# Patient Record
Sex: MALE | Race: OTHER | NOT HISPANIC OR LATINO | URBAN - METROPOLITAN AREA
[De-identification: names, ages, dates, MRNs, and addresses within clinical notes are randomized per-mention and may not be internally consistent; named-entity substitution may affect disease eponyms.]

---

## 2020-08-27 ENCOUNTER — EMERGENCY (EMERGENCY)
Facility: HOSPITAL | Age: 40
LOS: 1 days | Discharge: HOME | End: 2020-08-27
Attending: EMERGENCY MEDICINE
Payer: MEDICAID

## 2020-08-27 VITALS
SYSTOLIC BLOOD PRESSURE: 127 MMHG | WEIGHT: 198.42 LBS | TEMPERATURE: 98 F | RESPIRATION RATE: 16 BRPM | HEART RATE: 84 BPM | DIASTOLIC BLOOD PRESSURE: 85 MMHG | OXYGEN SATURATION: 98 %

## 2020-08-27 LAB
ALBUMIN SERPL ELPH-MCNC: 3.7 G/DL — SIGNIFICANT CHANGE UP (ref 3.5–5)
ALP SERPL-CCNC: 76 U/L — SIGNIFICANT CHANGE UP (ref 40–120)
ALT FLD-CCNC: 55 U/L DA — SIGNIFICANT CHANGE UP (ref 10–60)
ANION GAP SERPL CALC-SCNC: 4 MMOL/L — LOW (ref 5–17)
APPEARANCE UR: CLEAR — SIGNIFICANT CHANGE UP
AST SERPL-CCNC: 29 U/L — SIGNIFICANT CHANGE UP (ref 10–40)
BACTERIA # UR AUTO: ABNORMAL /HPF
BASOPHILS # BLD AUTO: 0.06 K/UL — SIGNIFICANT CHANGE UP (ref 0–0.2)
BASOPHILS NFR BLD AUTO: 0.7 % — SIGNIFICANT CHANGE UP (ref 0–2)
BILIRUB SERPL-MCNC: 0.4 MG/DL — SIGNIFICANT CHANGE UP (ref 0.2–1.2)
BILIRUB UR-MCNC: NEGATIVE — SIGNIFICANT CHANGE UP
BUN SERPL-MCNC: 23 MG/DL — HIGH (ref 7–18)
CALCIUM SERPL-MCNC: 8.8 MG/DL — SIGNIFICANT CHANGE UP (ref 8.4–10.5)
CHLORIDE SERPL-SCNC: 106 MMOL/L — SIGNIFICANT CHANGE UP (ref 96–108)
CO2 SERPL-SCNC: 29 MMOL/L — SIGNIFICANT CHANGE UP (ref 22–31)
COLOR SPEC: YELLOW — SIGNIFICANT CHANGE UP
CREAT SERPL-MCNC: 1.21 MG/DL — SIGNIFICANT CHANGE UP (ref 0.5–1.3)
DIFF PNL FLD: NEGATIVE — SIGNIFICANT CHANGE UP
EOSINOPHIL # BLD AUTO: 0.18 K/UL — SIGNIFICANT CHANGE UP (ref 0–0.5)
EOSINOPHIL NFR BLD AUTO: 2 % — SIGNIFICANT CHANGE UP (ref 0–6)
EPI CELLS # UR: SIGNIFICANT CHANGE UP /HPF
GLUCOSE SERPL-MCNC: 80 MG/DL — SIGNIFICANT CHANGE UP (ref 70–99)
GLUCOSE UR QL: NEGATIVE — SIGNIFICANT CHANGE UP
HCT VFR BLD CALC: 44.4 % — SIGNIFICANT CHANGE UP (ref 39–50)
HGB BLD-MCNC: 14.7 G/DL — SIGNIFICANT CHANGE UP (ref 13–17)
HIV 1 & 2 AB SERPL IA.RAPID: SIGNIFICANT CHANGE UP
IMM GRANULOCYTES NFR BLD AUTO: 0.5 % — SIGNIFICANT CHANGE UP (ref 0–1.5)
KETONES UR-MCNC: NEGATIVE — SIGNIFICANT CHANGE UP
LEUKOCYTE ESTERASE UR-ACNC: NEGATIVE — SIGNIFICANT CHANGE UP
LYMPHOCYTES # BLD AUTO: 2.87 K/UL — SIGNIFICANT CHANGE UP (ref 1–3.3)
LYMPHOCYTES # BLD AUTO: 32.7 % — SIGNIFICANT CHANGE UP (ref 13–44)
MAGNESIUM SERPL-MCNC: 2.3 MG/DL — SIGNIFICANT CHANGE UP (ref 1.6–2.6)
MCHC RBC-ENTMCNC: 29.3 PG — SIGNIFICANT CHANGE UP (ref 27–34)
MCHC RBC-ENTMCNC: 33.1 GM/DL — SIGNIFICANT CHANGE UP (ref 32–36)
MCV RBC AUTO: 88.4 FL — SIGNIFICANT CHANGE UP (ref 80–100)
MONOCYTES # BLD AUTO: 0.84 K/UL — SIGNIFICANT CHANGE UP (ref 0–0.9)
MONOCYTES NFR BLD AUTO: 9.6 % — SIGNIFICANT CHANGE UP (ref 2–14)
NEUTROPHILS # BLD AUTO: 4.8 K/UL — SIGNIFICANT CHANGE UP (ref 1.8–7.4)
NEUTROPHILS NFR BLD AUTO: 54.5 % — SIGNIFICANT CHANGE UP (ref 43–77)
NITRITE UR-MCNC: NEGATIVE — SIGNIFICANT CHANGE UP
NRBC # BLD: 0 /100 WBCS — SIGNIFICANT CHANGE UP (ref 0–0)
PH UR: 6 — SIGNIFICANT CHANGE UP (ref 5–8)
PHOSPHATE SERPL-MCNC: 2.8 MG/DL — SIGNIFICANT CHANGE UP (ref 2.5–4.5)
PLATELET # BLD AUTO: 199 K/UL — SIGNIFICANT CHANGE UP (ref 150–400)
POTASSIUM SERPL-MCNC: 3.8 MMOL/L — SIGNIFICANT CHANGE UP (ref 3.5–5.3)
POTASSIUM SERPL-SCNC: 3.8 MMOL/L — SIGNIFICANT CHANGE UP (ref 3.5–5.3)
PROT SERPL-MCNC: 8 G/DL — SIGNIFICANT CHANGE UP (ref 6–8.3)
PROT UR-MCNC: 30 MG/DL
RBC # BLD: 5.02 M/UL — SIGNIFICANT CHANGE UP (ref 4.2–5.8)
RBC # FLD: 16.1 % — HIGH (ref 10.3–14.5)
RBC CASTS # UR COMP ASSIST: SIGNIFICANT CHANGE UP /HPF (ref 0–2)
SODIUM SERPL-SCNC: 139 MMOL/L — SIGNIFICANT CHANGE UP (ref 135–145)
SP GR SPEC: 1.02 — SIGNIFICANT CHANGE UP (ref 1.01–1.02)
UROBILINOGEN FLD QL: 1
WBC # BLD: 8.79 K/UL — SIGNIFICANT CHANGE UP (ref 3.8–10.5)
WBC # FLD AUTO: 8.79 K/UL — SIGNIFICANT CHANGE UP (ref 3.8–10.5)
WBC UR QL: SIGNIFICANT CHANGE UP /HPF (ref 0–5)

## 2020-08-27 PROCEDURE — 84443 ASSAY THYROID STIM HORMONE: CPT

## 2020-08-27 PROCEDURE — 99283 EMERGENCY DEPT VISIT LOW MDM: CPT

## 2020-08-27 PROCEDURE — 80053 COMPREHEN METABOLIC PANEL: CPT

## 2020-08-27 PROCEDURE — 85027 COMPLETE CBC AUTOMATED: CPT

## 2020-08-27 PROCEDURE — 84100 ASSAY OF PHOSPHORUS: CPT

## 2020-08-27 PROCEDURE — 81001 URINALYSIS AUTO W/SCOPE: CPT

## 2020-08-27 PROCEDURE — 87086 URINE CULTURE/COLONY COUNT: CPT

## 2020-08-27 PROCEDURE — 99284 EMERGENCY DEPT VISIT MOD MDM: CPT

## 2020-08-27 PROCEDURE — 36415 COLL VENOUS BLD VENIPUNCTURE: CPT

## 2020-08-27 PROCEDURE — 86703 HIV-1/HIV-2 1 RESULT ANTBDY: CPT

## 2020-08-27 PROCEDURE — 83735 ASSAY OF MAGNESIUM: CPT

## 2020-08-27 PROCEDURE — 99285 EMERGENCY DEPT VISIT HI MDM: CPT

## 2020-08-27 RX ORDER — SODIUM CHLORIDE 9 MG/ML
1000 INJECTION INTRAMUSCULAR; INTRAVENOUS; SUBCUTANEOUS ONCE
Refills: 0 | Status: COMPLETED | OUTPATIENT
Start: 2020-08-27 | End: 2020-08-27

## 2020-08-27 RX ADMIN — SODIUM CHLORIDE 1000 MILLILITER(S): 9 INJECTION INTRAMUSCULAR; INTRAVENOUS; SUBCUTANEOUS at 17:04

## 2020-08-27 NOTE — ED PROVIDER NOTE - CLINICAL SUMMARY MEDICAL DECISION MAKING FREE TEXT BOX
Non specific pitting edema, not certain etiology. Instructed patient to see his PCP in depth comprehensive workup on malignantly.

## 2020-08-27 NOTE — ED PROVIDER NOTE - OBJECTIVE STATEMENT
41 y/o M patient with significant PMHx of vitiligo, last seen by doctor 1 year ago and no significant PSHx presents to the ED with c/o LE swelling, increase girth of abdomen to the left side that described as waxing and waning. Patient states that the sxs are worsening when standing. Patient denies any pain, GI sxs or any other or any other complaints. Patient reports having symptoms of dysuria for the past 3 weeks.

## 2020-08-27 NOTE — ED PROVIDER NOTE - NSFOLLOWUPINSTRUCTIONS_ED_ALL_ED_FT
Drink plenty of fluids.  Follow up with your doctor/Endocrinologist or in the Clinic as discussed within 1 week.  Return to the ER for any concerns.

## 2020-08-27 NOTE — ED PROVIDER NOTE - NSFOLLOWUPCLINICS_GEN_ALL_ED_FT
[facility]  Endocrinology  [address] [place] [address]  Phone: [phone]  Fax:     [facility]  Endocrinology  [address] [place] [address]  Phone: [phone]  Fax: [phone]    [facility]  Internal Medicine  [address] [place] [address]  Phone: [phone]  Fax: [phone]  Follow Up Time:

## 2020-08-27 NOTE — ED PROVIDER NOTE - PATIENT PORTAL LINK FT
You can access the FollowMyHealth Patient Portal offered by [facility] by registering at the following website: [email or web address]. By joining [facility]’s FollowMyHealth portal, you will also be able to view your health information using other applications (apps) compatible with our system.

## 2020-08-27 NOTE — ED ADULT NURSE NOTE - NSIMPLEMENTINTERV_GEN_ALL_ED
Implemented All Universal Safety Interventions:  [place] to call system. Call bell, personal items and telephone within reach. Instruct patient to call for assistance. Room bathroom lighting operational. Non-slip footwear when patient is off stretcher. Physically safe environment: no spills, clutter or unnecessary equipment. Stretcher in lowest position, wheels locked, appropriate side rails in place.

## 2020-08-27 NOTE — ED PROVIDER NOTE - CARE PROVIDER_API CALL
Pat Livingston  ENDOCRINOLOGY/METAB/DIABETES  [address] [place] [id number]  Phone: [phone]  Fax: [phone]  Follow Up Time:     Guille Meyer  ENDOCRINOLOGY/METAB/DIABETES  [address] [facility], [address]rd [place] [address]  Phone: [phone]  Fax: [phone]  Follow Up Time:

## 2020-08-28 LAB
CULTURE RESULTS: SIGNIFICANT CHANGE UP
SPECIMEN SOURCE: SIGNIFICANT CHANGE UP

## 2020-09-28 ENCOUNTER — APPOINTMENT (OUTPATIENT)
Dept: INTERNAL MEDICINE | Facility: CLINIC | Age: 40
End: 2020-09-28
Payer: COMMERCIAL

## 2020-09-28 VITALS
OXYGEN SATURATION: 98 % | HEIGHT: 67 IN | BODY MASS INDEX: 31.08 KG/M2 | TEMPERATURE: 97.6 F | DIASTOLIC BLOOD PRESSURE: 77 MMHG | HEART RATE: 69 BPM | WEIGHT: 198 LBS | RESPIRATION RATE: 16 BRPM | SYSTOLIC BLOOD PRESSURE: 118 MMHG

## 2020-09-28 DIAGNOSIS — Z83.3 FAMILY HISTORY OF DIABETES MELLITUS: ICD-10-CM

## 2020-09-28 DIAGNOSIS — Z11.3 ENCOUNTER FOR SCREENING FOR INFECTIONS WITH A PREDOMINANTLY SEXUAL MODE OF TRANSMISSION: ICD-10-CM

## 2020-09-28 PROCEDURE — 99202 OFFICE O/P NEW SF 15 MIN: CPT

## 2020-09-28 PROCEDURE — 99386 PREV VISIT NEW AGE 40-64: CPT | Mod: 25

## 2020-10-05 PROBLEM — Z11.3 SCREEN FOR STD (SEXUALLY TRANSMITTED DISEASE): Status: ACTIVE | Noted: 2020-09-28

## 2020-10-05 NOTE — HEALTH RISK ASSESSMENT
[No] : In the past 12 months have you used drugs other than those required for medical reasons? No [0] : 2) Feeling down, depressed, or hopeless: Not at all (0) [Hepatitis C test offered] : Hepatitis C test offered [None] : None [With Family] : lives with family [Employed] : employed [] :  [Sexually Active] : sexually active [Feels Safe at Home] : Feels safe at home [Fully functional (bathing, dressing, toileting, transferring, walking, feeding)] : Fully functional (bathing, dressing, toileting, transferring, walking, feeding) [Fully functional (using the telephone, shopping, preparing meals, housekeeping, doing laundry, using] : Fully functional and needs no help or supervision to perform IADLs (using the telephone, shopping, preparing meals, housekeeping, doing laundry, using transportation, managing medications and managing finances) [] : No [de-identified] : SMOKE FOR 20 YRS; 1 PACK A DAY; QUIT FOR 9 months ago  [[id number]] : 0 [Change in mental status noted] : No change in mental status noted [Language] : denies difficulty with language [Behavior] : denies difficulty with behavior [Learning/Retaining New Information] : denies difficulty learning/retaining new information [Handling Complex Tasks] : denies difficulty handling complex tasks [Reasoning] : denies difficulty with reasoning [Spatial Ability and Orientation] : denies difficulty with spatial ability and orientation [High Risk Behavior] : no high risk behavior [HIVDate] : 08/2020 [HIVComments] : NEGATIVE  [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

## 2020-10-05 NOTE — REVIEW OF SYSTEMS
[Fever] : no fever [Chills] : no chills [Fatigue] : fatigue [Discharge] : no discharge [Pain] : no pain [Redness] : no redness [Dryness] : no dryness [Vision Problems] : no vision problems [Itching] : no itching [Earache] : no earache [Hearing Loss] : no hearing loss [Nosebleeds] : no nosebleeds [Postnasal Drip] : no postnasal drip [Nasal Discharge] : no nasal discharge [Sore Throat] : no sore throat [Hoarseness] : no hoarseness [Chest Pain] : no chest pain [Palpitations] : no palpitations [Claudication] : no  leg claudication [Lower Ext Edema] : no lower extremity edema [Orthopena] : no orthopnea [Paroxysmal Nocturnal Dyspnea] : no paroxysmal nocturnal dyspnea [Shortness Of Breath] : no shortness of breath [Wheezing] : no wheezing [Cough] : no cough [Dyspnea on Exertion] : not dyspnea on exertion [Abdominal Pain] : no abdominal pain [Nausea] : no nausea [Constipation] : no constipation [Diarrhea] : no diarrhea [Vomiting] : no vomiting [Heartburn] : no heartburn [Melena] : no melena [Dysuria] : no dysuria [Incontinence] : no incontinence [Hesitancy] : no hesitancy [Nocturia] : no nocturia [Hematuria] : no hematuria [Frequency] : no frequency [Impotence] : no impotency [Joint Pain] : no joint pain [Joint Stiffness] : no joint stiffness [Muscle Pain] : no muscle pain [Back Pain] : no back pain [Joint Swelling] : no joint swelling [Headache] : no headache [Dizziness] : no dizziness [Fainting] : no fainting [Confusion] : no confusion [Unsteady Walk] : no ataxia [Memory Loss] : no memory loss [Suicidal] : not suicidal [Insomnia] : no insomnia [Anxiety] : no anxiety [Depression] : no depression [de-identified] : as per HPI

## 2020-10-05 NOTE — HISTORY OF PRESENT ILLNESS
[de-identified] : 40 y.o Yi male with PMHx of vitiligo and recently diagnosed hypothyroidism and proteinuria AND Obesity ,PreDM,  comes in to establish care and follow up with ED visit;\par \par pt last PCP visit was in 2 yrs ago;\par \par pt was in ED  in 08/31/2020 in [facility] due to leg swelling; \par pt had leg swelling and fatigue and constipation and feeling sleepy for last 6 months; and worsening in last 3 months; \par pt was in [facility] ED and had TSH at 47.10;  had HIV negative at 08/27/2020; has not follow up with ENDO as referred;

## 2020-10-05 NOTE — PHYSICAL EXAM
[No Acute Distress] : no acute distress [Well Nourished] : well nourished [Well Developed] : well developed [Well-Appearing] : well-appearing [Normal Sclera/Conjunctiva] : normal sclera/conjunctiva [PERRL] : pupils equal round and reactive to light [EOMI] : extraocular movements intact [Normal Outer Ear/Nose] : the outer ears and nose were normal in appearance [Normal Oropharynx] : the oropharynx was normal [No JVD] : no jugular venous distention [No Lymphadenopathy] : no lymphadenopathy [Supple] : supple [No Respiratory Distress] : no respiratory distress  [No Accessory Muscle Use] : no accessory muscle use [Clear to Auscultation] : lungs were clear to auscultation bilaterally [Normal Rate] : normal rate  [Regular Rhythm] : with a regular rhythm [Normal S1, S2] : normal S1 and S2 [No Murmur] : no murmur heard [Pedal Pulses Present] : the pedal pulses are present [No Edema] : there was no peripheral edema [Soft] : abdomen soft [Non Tender] : non-tender [Non-distended] : non-distended [No Masses] : no abdominal mass palpated [Normal Bowel Sounds] : normal bowel sounds [Normal Posterior Cervical Nodes] : no posterior cervical lymphadenopathy [Normal Anterior Cervical Nodes] : no anterior cervical lymphadenopathy [No CVA Tenderness] : no CVA  tenderness [No Spinal Tenderness] : no spinal tenderness [No Joint Swelling] : no joint swelling [Grossly Normal Strength/Tone] : grossly normal strength/tone [Coordination Grossly Intact] : coordination grossly intact [No Focal Deficits] : no focal deficits [Normal Gait] : normal gait [Speech Grossly Normal] : speech grossly normal [Memory Grossly Normal] : memory grossly normal [Normal Affect] : the affect was normal [Alert and Oriented x3] : oriented to person, place, and time [Normal Mood] : the mood was normal [Normal Insight/Judgement] : insight and judgment were intact [de-identified] : skin discoloration

## 2020-10-11 LAB
25(OH)D3 SERPL-MCNC: 10.2 NG/ML
ALBUMIN SERPL ELPH-MCNC: 4.8 G/DL
ALP BLD-CCNC: 72 U/L
ALT SERPL-CCNC: 45 U/L
ANION GAP SERPL CALC-SCNC: 12 MMOL/L
APPEARANCE: CLEAR
AST SERPL-CCNC: 34 U/L
BASOPHILS # BLD AUTO: 0.08 K/UL
BASOPHILS NFR BLD AUTO: 1 %
BILIRUB SERPL-MCNC: 0.2 MG/DL
BILIRUBIN URINE: NEGATIVE
BLOOD URINE: NEGATIVE
BUN SERPL-MCNC: 28 MG/DL
C TRACH RRNA SPEC QL NAA+PROBE: NOT DETECTED
CALCIUM SERPL-MCNC: 9.5 MG/DL
CHLORIDE SERPL-SCNC: 106 MMOL/L
CHOLEST SERPL-MCNC: 185 MG/DL
CHOLEST/HDLC SERPL: 4.4 RATIO
CO2 SERPL-SCNC: 23 MMOL/L
COLOR: NORMAL
CREAT SERPL-MCNC: 1.45 MG/DL
EOSINOPHIL # BLD AUTO: 0.29 K/UL
EOSINOPHIL NFR BLD AUTO: 3.5 %
ESTIMATED AVERAGE GLUCOSE: 140 MG/DL
GLUCOSE QUALITATIVE U: NEGATIVE
GLUCOSE SERPL-MCNC: 111 MG/DL
HBA1C MFR BLD HPLC: 6.5 %
HBV SURFACE AB SER QL: NONREACTIVE
HBV SURFACE AG SER QL: NONREACTIVE
HCT VFR BLD CALC: 43.5 %
HCV AB SER QL: NONREACTIVE
HCV S/CO RATIO: 0.15 S/CO
HDLC SERPL-MCNC: 43 MG/DL
HGB BLD-MCNC: 14.3 G/DL
HSV 1+2 IGG SER IA-IMP: NEGATIVE
HSV 1+2 IGG SER IA-IMP: POSITIVE
HSV1 IGG SER QL: 51.7 INDEX
HSV1 IGM SER QL: NORMAL TITER
HSV2 AB FLD-ACNC: NORMAL TITER
HSV2 IGG SER QL: 0.12 INDEX
IMM GRANULOCYTES NFR BLD AUTO: 0.2 %
KETONES URINE: NEGATIVE
LDLC SERPL CALC-MCNC: 104 MG/DL
LEUKOCYTE ESTERASE URINE: NEGATIVE
LYMPHOCYTES # BLD AUTO: 3.33 K/UL
LYMPHOCYTES NFR BLD AUTO: 40.7 %
MAN DIFF?: NORMAL
MCHC RBC-ENTMCNC: 30.1 PG
MCHC RBC-ENTMCNC: 32.9 GM/DL
MCV RBC AUTO: 91.6 FL
MONOCYTES # BLD AUTO: 0.67 K/UL
MONOCYTES NFR BLD AUTO: 8.2 %
N GONORRHOEA RRNA SPEC QL NAA+PROBE: NOT DETECTED
NEUTROPHILS # BLD AUTO: 3.79 K/UL
NEUTROPHILS NFR BLD AUTO: 46.4 %
NITRITE URINE: NEGATIVE
PH URINE: 7
PLATELET # BLD AUTO: 163 K/UL
POTASSIUM SERPL-SCNC: 4.2 MMOL/L
PROT SERPL-MCNC: 7.3 G/DL
PROTEIN URINE: NORMAL
RBC # BLD: 4.75 M/UL
RBC # FLD: 16.2 %
SODIUM SERPL-SCNC: 140 MMOL/L
SOURCE AMPLIFICATION: NORMAL
SPECIFIC GRAVITY URINE: 1.03
T PALLIDUM AB SER QL IA: NEGATIVE
T3 SERPL-MCNC: 59 NG/DL
T4 FREE SERPL-MCNC: 0.2 NG/DL
TRIGL SERPL-MCNC: 195 MG/DL
TSH SERPL-ACNC: 55 UIU/ML
UROBILINOGEN URINE: ABNORMAL
WBC # FLD AUTO: 8.18 K/UL

## 2020-10-12 PROBLEM — Z78.9 OTHER SPECIFIED HEALTH STATUS: Chronic | Status: ACTIVE | Noted: 2020-08-27

## 2020-10-15 ENCOUNTER — APPOINTMENT (OUTPATIENT)
Dept: DERMATOLOGY | Facility: CLINIC | Age: 40
End: 2020-10-15
Payer: COMMERCIAL

## 2020-10-15 VITALS — BODY MASS INDEX: 30.61 KG/M2 | HEIGHT: 67 IN | WEIGHT: 195 LBS

## 2020-10-15 VITALS — TEMPERATURE: 97.8 F

## 2020-10-15 DIAGNOSIS — L73.2 HIDRADENITIS SUPPURATIVA: ICD-10-CM

## 2020-10-15 DIAGNOSIS — Z85.828 PERSONAL HISTORY OF OTHER MALIGNANT NEOPLASM OF SKIN: ICD-10-CM

## 2020-10-15 DIAGNOSIS — Z77.22 CONTACT WITH AND (SUSPECTED) EXPOSURE TO ENVIRONMENTAL TOBACCO SMOKE (ACUTE) (CHRONIC): ICD-10-CM

## 2020-10-15 DIAGNOSIS — Z87.448 PERSONAL HISTORY OF OTHER DISEASES OF URINARY SYSTEM: ICD-10-CM

## 2020-10-15 DIAGNOSIS — Z87.2 PERSONAL HISTORY OF DISEASES OF THE SKIN AND SUBCUTANEOUS TISSUE: ICD-10-CM

## 2020-10-15 DIAGNOSIS — Z78.9 OTHER SPECIFIED HEALTH STATUS: ICD-10-CM

## 2020-10-15 PROCEDURE — 99203 OFFICE O/P NEW LOW 30 MIN: CPT | Mod: 25

## 2020-10-15 PROCEDURE — 11900 INJECT SKIN LESIONS </W 7: CPT

## 2020-10-19 ENCOUNTER — NON-APPOINTMENT (OUTPATIENT)
Age: 40
End: 2020-10-19

## 2020-11-13 ENCOUNTER — APPOINTMENT (OUTPATIENT)
Dept: INTERNAL MEDICINE | Facility: CLINIC | Age: 40
End: 2020-11-13
Payer: COMMERCIAL

## 2020-11-13 VITALS
HEART RATE: 67 BPM | OXYGEN SATURATION: 98 % | RESPIRATION RATE: 16 BRPM | SYSTOLIC BLOOD PRESSURE: 124 MMHG | BODY MASS INDEX: 30.45 KG/M2 | TEMPERATURE: 98.3 F | DIASTOLIC BLOOD PRESSURE: 82 MMHG | HEIGHT: 67 IN | WEIGHT: 194 LBS

## 2020-11-13 DIAGNOSIS — R73.03 PREDIABETES.: ICD-10-CM

## 2020-11-13 PROCEDURE — 99072 ADDL SUPL MATRL&STAF TM PHE: CPT

## 2020-11-13 PROCEDURE — 99213 OFFICE O/P EST LOW 20 MIN: CPT | Mod: 25

## 2020-11-13 PROCEDURE — G0008: CPT

## 2020-11-13 PROCEDURE — 90686 IIV4 VACC NO PRSV 0.5 ML IM: CPT

## 2020-11-16 NOTE — PHYSICAL EXAM
[No Acute Distress] : no acute distress [Well Nourished] : well nourished [Well Developed] : well developed [Well-Appearing] : well-appearing [Normal Sclera/Conjunctiva] : normal sclera/conjunctiva [PERRL] : pupils equal round and reactive to light [EOMI] : extraocular movements intact [Normal Outer Ear/Nose] : the outer ears and nose were normal in appearance [Normal Oropharynx] : the oropharynx was normal [No JVD] : no jugular venous distention [No Lymphadenopathy] : no lymphadenopathy [Supple] : supple [No Respiratory Distress] : no respiratory distress  [No Accessory Muscle Use] : no accessory muscle use [Clear to Auscultation] : lungs were clear to auscultation bilaterally [Normal Rate] : normal rate  [Regular Rhythm] : with a regular rhythm [Normal S1, S2] : normal S1 and S2 [No Murmur] : no murmur heard [Pedal Pulses Present] : the pedal pulses are present [No Edema] : there was no peripheral edema [Soft] : abdomen soft [Non Tender] : non-tender [Non-distended] : non-distended [No Masses] : no abdominal mass palpated [Normal Bowel Sounds] : normal bowel sounds [Normal Posterior Cervical Nodes] : no posterior cervical lymphadenopathy [Normal Anterior Cervical Nodes] : no anterior cervical lymphadenopathy [No CVA Tenderness] : no CVA  tenderness [No Spinal Tenderness] : no spinal tenderness [No Joint Swelling] : no joint swelling [Grossly Normal Strength/Tone] : grossly normal strength/tone [Coordination Grossly Intact] : coordination grossly intact [No Focal Deficits] : no focal deficits [Normal Gait] : normal gait [Speech Grossly Normal] : speech grossly normal [Memory Grossly Normal] : memory grossly normal [Normal Affect] : the affect was normal [Alert and Oriented x3] : oriented to person, place, and time [Normal Mood] : the mood was normal [Normal Insight/Judgement] : insight and judgment were intact [de-identified] : skin discoloration

## 2020-11-16 NOTE — HEALTH RISK ASSESSMENT
[No] : In the past 12 months have you used drugs other than those required for medical reasons? No [0] : 2) Feeling down, depressed, or hopeless: Not at all (0) [Hepatitis C test offered] : Hepatitis C test offered [None] : None [With Family] : lives with family [Employed] : employed [] :  [Sexually Active] : sexually active [Feels Safe at Home] : Feels safe at home [Fully functional (bathing, dressing, toileting, transferring, walking, feeding)] : Fully functional (bathing, dressing, toileting, transferring, walking, feeding) [Fully functional (using the telephone, shopping, preparing meals, housekeeping, doing laundry, using] : Fully functional and needs no help or supervision to perform IADLs (using the telephone, shopping, preparing meals, housekeeping, doing laundry, using transportation, managing medications and managing finances) [] : No [de-identified] : SMOKE FOR 20 YRS; 1 PACK A DAY; QUIT FOR 9 months ago  [[id number]] : 0 [Change in mental status noted] : No change in mental status noted [Language] : denies difficulty with language [Behavior] : denies difficulty with behavior [Learning/Retaining New Information] : denies difficulty learning/retaining new information [Handling Complex Tasks] : denies difficulty handling complex tasks [Reasoning] : denies difficulty with reasoning [Spatial Ability and Orientation] : denies difficulty with spatial ability and orientation [High Risk Behavior] : no high risk behavior [HIVDate] : 08/2020 [HIVComments] : NEGATIVE  [de-identified] : WITH WIFE ONLY [FreeTextEntry2] : sudheer

## 2020-11-16 NOTE — REVIEW OF SYSTEMS
[Fatigue] : fatigue [Fever] : no fever [Chills] : no chills [Discharge] : no discharge [Pain] : no pain [Redness] : no redness [Dryness] : no dryness [Vision Problems] : no vision problems [Itching] : no itching [Earache] : no earache [Hearing Loss] : no hearing loss [Nosebleeds] : no nosebleeds [Postnasal Drip] : no postnasal drip [Nasal Discharge] : no nasal discharge [Sore Throat] : no sore throat [Hoarseness] : no hoarseness [Chest Pain] : no chest pain [Palpitations] : no palpitations [Claudication] : no  leg claudication [Lower Ext Edema] : no lower extremity edema [Orthopena] : no orthopnea [Paroxysmal Nocturnal Dyspnea] : no paroxysmal nocturnal dyspnea [Shortness Of Breath] : no shortness of breath [Wheezing] : no wheezing [Cough] : no cough [Dyspnea on Exertion] : not dyspnea on exertion [Abdominal Pain] : no abdominal pain [Nausea] : no nausea [Constipation] : no constipation [Diarrhea] : no diarrhea [Vomiting] : no vomiting [Heartburn] : no heartburn [Melena] : no melena [Dysuria] : no dysuria [Incontinence] : no incontinence [Hesitancy] : no hesitancy [Nocturia] : no nocturia [Hematuria] : no hematuria [Frequency] : no frequency [Impotence] : no impotency [Joint Pain] : no joint pain [Joint Stiffness] : no joint stiffness [Muscle Pain] : no muscle pain [Back Pain] : no back pain [Joint Swelling] : no joint swelling [Headache] : no headache [Dizziness] : no dizziness [Fainting] : no fainting [Confusion] : no confusion [Unsteady Walk] : no ataxia [Memory Loss] : no memory loss [Suicidal] : not suicidal [Insomnia] : no insomnia [Anxiety] : no anxiety [Depression] : no depression [de-identified] : as per HPI

## 2020-12-10 LAB
ALBUMIN SERPL ELPH-MCNC: 4.5 G/DL
ALP BLD-CCNC: 70 U/L
ALT SERPL-CCNC: 27 U/L
ANION GAP SERPL CALC-SCNC: 12 MMOL/L
AST SERPL-CCNC: 20 U/L
BILIRUB SERPL-MCNC: 0.3 MG/DL
BUN SERPL-MCNC: 19 MG/DL
CALCIUM SERPL-MCNC: 9.7 MG/DL
CHLORIDE SERPL-SCNC: 101 MMOL/L
CO2 SERPL-SCNC: 26 MMOL/L
CREAT SERPL-MCNC: 0.86 MG/DL
CREAT SPEC-SCNC: 78 MG/DL
GLUCOSE SERPL-MCNC: 95 MG/DL
MICROALBUMIN 24H UR DL<=1MG/L-MCNC: 1.3 MG/DL
MICROALBUMIN/CREAT 24H UR-RTO: 17 MG/G
POTASSIUM SERPL-SCNC: 4.2 MMOL/L
PROT SERPL-MCNC: 7.2 G/DL
SODIUM SERPL-SCNC: 139 MMOL/L
T3 SERPL-MCNC: 117 NG/DL
T4 FREE SERPL-MCNC: 1.6 NG/DL
TSH SERPL-ACNC: 4.66 UIU/ML

## 2020-12-11 DIAGNOSIS — Z87.448 PERSONAL HISTORY OF OTHER DISEASES OF URINARY SYSTEM: ICD-10-CM

## 2020-12-28 ENCOUNTER — RX RENEWAL (OUTPATIENT)
Age: 40
End: 2020-12-28

## 2021-02-17 ENCOUNTER — APPOINTMENT (OUTPATIENT)
Dept: INTERNAL MEDICINE | Facility: CLINIC | Age: 41
End: 2021-02-17
Payer: MEDICAID

## 2021-02-17 VITALS
OXYGEN SATURATION: 99 % | TEMPERATURE: 97.7 F | WEIGHT: 194 LBS | HEIGHT: 67 IN | BODY MASS INDEX: 30.45 KG/M2 | HEART RATE: 80 BPM | SYSTOLIC BLOOD PRESSURE: 129 MMHG | DIASTOLIC BLOOD PRESSURE: 80 MMHG | RESPIRATION RATE: 16 BRPM

## 2021-02-17 PROCEDURE — 99213 OFFICE O/P EST LOW 20 MIN: CPT

## 2021-02-17 PROCEDURE — 99072 ADDL SUPL MATRL&STAF TM PHE: CPT

## 2021-03-08 LAB
25(OH)D3 SERPL-MCNC: 37.8 NG/ML
ALBUMIN SERPL ELPH-MCNC: 4.5 G/DL
ALP BLD-CCNC: 66 U/L
ALT SERPL-CCNC: 40 U/L
ANION GAP SERPL CALC-SCNC: 16 MMOL/L
AST SERPL-CCNC: 27 U/L
BASOPHILS # BLD AUTO: 0.05 K/UL
BASOPHILS NFR BLD AUTO: 0.7 %
BILIRUB SERPL-MCNC: 0.4 MG/DL
BUN SERPL-MCNC: 18 MG/DL
CALCIUM SERPL-MCNC: 10.2 MG/DL
CHLORIDE SERPL-SCNC: 103 MMOL/L
CHOLEST SERPL-MCNC: 116 MG/DL
CO2 SERPL-SCNC: 22 MMOL/L
CREAT SERPL-MCNC: 1.03 MG/DL
CREAT SPEC-SCNC: 114 MG/DL
EOSINOPHIL # BLD AUTO: 0.22 K/UL
EOSINOPHIL NFR BLD AUTO: 3 %
ESTIMATED AVERAGE GLUCOSE: 137 MG/DL
GLUCOSE SERPL-MCNC: 111 MG/DL
HBA1C MFR BLD HPLC: 6.4 %
HCT VFR BLD CALC: 47.8 %
HDLC SERPL-MCNC: 38 MG/DL
HGB BLD-MCNC: 15.4 G/DL
IMM GRANULOCYTES NFR BLD AUTO: 0.1 %
LDLC SERPL CALC-MCNC: 59 MG/DL
LYMPHOCYTES # BLD AUTO: 2.52 K/UL
LYMPHOCYTES NFR BLD AUTO: 34.8 %
MAN DIFF?: NORMAL
MCHC RBC-ENTMCNC: 28.8 PG
MCHC RBC-ENTMCNC: 32.2 GM/DL
MCV RBC AUTO: 89.5 FL
MICROALBUMIN 24H UR DL<=1MG/L-MCNC: 1.8 MG/DL
MICROALBUMIN/CREAT 24H UR-RTO: 15 MG/G
MONOCYTES # BLD AUTO: 0.65 K/UL
MONOCYTES NFR BLD AUTO: 9 %
NEUTROPHILS # BLD AUTO: 3.79 K/UL
NEUTROPHILS NFR BLD AUTO: 52.4 %
NONHDLC SERPL-MCNC: 79 MG/DL
PLATELET # BLD AUTO: 190 K/UL
POTASSIUM SERPL-SCNC: 4.7 MMOL/L
PROT SERPL-MCNC: 7.2 G/DL
RBC # BLD: 5.34 M/UL
RBC # FLD: 13.4 %
SODIUM SERPL-SCNC: 142 MMOL/L
T3 SERPL-MCNC: 229 NG/DL
T4 SERPL-MCNC: 13.7 UG/DL
TRIGL SERPL-MCNC: 99 MG/DL
TSH SERPL-ACNC: 0.47 UIU/ML
WBC # FLD AUTO: 7.24 K/UL

## 2021-03-10 NOTE — HEALTH RISK ASSESSMENT
[No] : In the past 12 months have you used drugs other than those required for medical reasons? No [0] : 2) Feeling down, depressed, or hopeless: Not at all (0) [Hepatitis C test offered] : Hepatitis C test offered [None] : None [With Family] : lives with family [Employed] : employed [] :  [Sexually Active] : sexually active [Feels Safe at Home] : Feels safe at home [Fully functional (bathing, dressing, toileting, transferring, walking, feeding)] : Fully functional (bathing, dressing, toileting, transferring, walking, feeding) [Fully functional (using the telephone, shopping, preparing meals, housekeeping, doing laundry, using] : Fully functional and needs no help or supervision to perform IADLs (using the telephone, shopping, preparing meals, housekeeping, doing laundry, using transportation, managing medications and managing finances) [] : No [de-identified] : SMOKE FOR 20 YRS; 1 PACK A DAY; QUIT FOR 9 months ago  [[id number]] : 0 [Change in mental status noted] : No change in mental status noted [Language] : denies difficulty with language [Behavior] : denies difficulty with behavior [Learning/Retaining New Information] : denies difficulty learning/retaining new information [Handling Complex Tasks] : denies difficulty handling complex tasks [Reasoning] : denies difficulty with reasoning [Spatial Ability and Orientation] : denies difficulty with spatial ability and orientation [High Risk Behavior] : no high risk behavior [HIVDate] : 08/2020 [HIVComments] : NEGATIVE  [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

## 2021-03-10 NOTE — REVIEW OF SYSTEMS
[Fatigue] : fatigue [Fever] : no fever [Chills] : no chills [Discharge] : no discharge [Pain] : no pain [Redness] : no redness [Dryness] : no dryness [Vision Problems] : no vision problems [Itching] : no itching [Earache] : no earache [Hearing Loss] : no hearing loss [Nosebleeds] : no nosebleeds [Postnasal Drip] : no postnasal drip [Nasal Discharge] : no nasal discharge [Sore Throat] : no sore throat [Hoarseness] : no hoarseness [Chest Pain] : no chest pain [Palpitations] : no palpitations [Claudication] : no  leg claudication [Lower Ext Edema] : no lower extremity edema [Orthopena] : no orthopnea [Paroxysmal Nocturnal Dyspnea] : no paroxysmal nocturnal dyspnea [Shortness Of Breath] : no shortness of breath [Wheezing] : no wheezing [Cough] : no cough [Dyspnea on Exertion] : not dyspnea on exertion [Abdominal Pain] : no abdominal pain [Nausea] : no nausea [Constipation] : no constipation [Diarrhea] : no diarrhea [Vomiting] : no vomiting [Heartburn] : no heartburn [Melena] : no melena [Dysuria] : no dysuria [Incontinence] : no incontinence [Hesitancy] : no hesitancy [Nocturia] : no nocturia [Hematuria] : no hematuria [Frequency] : no frequency [Impotence] : no impotency [Joint Pain] : no joint pain [Joint Stiffness] : no joint stiffness [Muscle Pain] : no muscle pain [Back Pain] : no back pain [Joint Swelling] : no joint swelling [Headache] : no headache [Dizziness] : no dizziness [Fainting] : no fainting [Confusion] : no confusion [Unsteady Walk] : no ataxia [Memory Loss] : no memory loss [Suicidal] : not suicidal [Insomnia] : no insomnia [Anxiety] : no anxiety [Depression] : no depression [de-identified] : as per HPI

## 2021-03-10 NOTE — HISTORY OF PRESENT ILLNESS
[de-identified] : 40 y.o Amharic male with PMHx of vitiligo and recently diagnosed hypothyroidism and proteinuria AND Obesity ,DMII,  comes in to follow up \par \par pt last PCP visit was in 2 yrs ago;\par \par pt was in ED  in 08/31/2020 in [facility] due to leg swelling; \par pt had leg swelling and fatigue and constipation and feeling sleepy for last 6 months; and worsening in last 3 months; \par pt was in [facility] ED and had TSH at 47.10;  had HIV negative at 08/27/2020; has not follow up with ENDO as referred;\par \par interval Hx 11/13/2020: \par \par leg swelling resolved s/p on levothyroxine; compliance with his home meds \par has not follow up with us THYROID OR endo \par saw derm FOR VITILIGO and Hidradenitis \par denies of any HA/CP/SOB/Palpitation\par no other compliant \par \par \par interval Hx 02/17/2021: \par \par compliance with all his home meds; \par \par has not follow up with ophthal, dietitian or do the thyroid US;

## 2021-03-10 NOTE — ASSESSMENT
[FreeTextEntry1] : DECLINE FLU SHOT THIS VITIS \par \par advised to covid19 vaccination; infor provided \par \par \par labs meds and referral as ordered above

## 2021-05-14 ENCOUNTER — RX RENEWAL (OUTPATIENT)
Age: 41
End: 2021-05-14

## 2021-07-10 ENCOUNTER — RX RENEWAL (OUTPATIENT)
Age: 41
End: 2021-07-10

## 2021-09-24 ENCOUNTER — RX RENEWAL (OUTPATIENT)
Age: 41
End: 2021-09-24

## 2021-10-29 ENCOUNTER — APPOINTMENT (OUTPATIENT)
Dept: INTERNAL MEDICINE | Facility: CLINIC | Age: 41
End: 2021-10-29
Payer: COMMERCIAL

## 2021-10-29 PROCEDURE — 99442: CPT

## 2021-10-29 NOTE — HEALTH RISK ASSESSMENT
[No] : In the past 12 months have you used drugs other than those required for medical reasons? No [0] : 2) Feeling down, depressed, or hopeless: Not at all (0) [Hepatitis C test offered] : Hepatitis C test offered [None] : None [With Family] : lives with family [Employed] : employed [] :  [Sexually Active] : sexually active [Feels Safe at Home] : Feels safe at home [Fully functional (bathing, dressing, toileting, transferring, walking, feeding)] : Fully functional (bathing, dressing, toileting, transferring, walking, feeding) [Fully functional (using the telephone, shopping, preparing meals, housekeeping, doing laundry, using] : Fully functional and needs no help or supervision to perform IADLs (using the telephone, shopping, preparing meals, housekeeping, doing laundry, using transportation, managing medications and managing finances) [] : No [de-identified] : SMOKE FOR 20 YRS; 1 PACK A DAY; QUIT FOR 9 months ago  [[id number]] : 0 [Change in mental status noted] : No change in mental status noted [Language] : denies difficulty with language [Behavior] : denies difficulty with behavior [Learning/Retaining New Information] : denies difficulty learning/retaining new information [Handling Complex Tasks] : denies difficulty handling complex tasks [Reasoning] : denies difficulty with reasoning [Spatial Ability and Orientation] : denies difficulty with spatial ability and orientation [High Risk Behavior] : no high risk behavior [HIVDate] : 08/2020 [HIVComments] : NEGATIVE  [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

## 2021-10-29 NOTE — HISTORY OF PRESENT ILLNESS
[de-identified] : This visit was provided via TELEPHONE. The patient, SAMM HURST , was located at home,[address] [place] [address]\[place] [address] , at the time of the visit. \par The provider,RANJEET BISHOP , was located at his medical office located in  at the time of the visit. The patient, and Provider participated in the TELEPHONE\par Verbal consent given on Oct 29 2021  4:00PM by the patient.\par \par \par 41 y.o Turkish male with PMHx of vitiligo and recently diagnosed hypothyroidism and proteinuria AND Obesity ,DMII, comes in to follow up \par \par pt last PCP visit was in 2 yrs ago;\par \par pt was in ED in 08/31/2020 in [facility] due to leg swelling; \par pt had leg swelling and fatigue and constipation and feeling sleepy for last 6 months; and worsening in last 3 months; \par pt was in [facility] ED and had TSH at 47.10; had HIV negative at 08/27/2020; has not follow up with ENDO as referred;\par \par interval Hx 11/13/2020: \par \par leg swelling resolved s/p on levothyroxine; compliance with his home meds \par has not follow up with us THYROID OR endo \par saw derm FOR VITILIGO and Hidradenitis \par denies of any HA/CP/SOB/Palpitation\par no other compliant \par \par \par interval Hx 02/17/2021: \par \par compliance with all his home meds; \par \par interval hx 10/29/2021\par \par has not follow up since 02/2012;\par has not do US thyroid

## 2021-10-29 NOTE — ASSESSMENT
[FreeTextEntry1] : \par dmii and hld and hyroid meds refilled\par \par refer to thyroid US again and pt need to see podiatry and dietitian and ophthal and will discussed when pt see me in 12/2021; \par \par pt aware that he need to do this US thyroid before he sees me; task staff to mail the order\par \par \par I spend a total of 15 minutes on the date of the encounter evaluating and treating patient\par

## 2021-11-05 NOTE — ED ADULT NURSE NOTE - CAS TRG GEN SKIN CONDITION
Warm Bilobed Flap Text: The defect edges were debeveled with a #15c scalpel blade.  Given the location of the defect and the proximity to free margins a bilobe flap was deemed most appropriate.  Using a sterile surgical marker, an appropriate bilobe flap drawn around the defect.    The area thus outlined was incised deep to adipose tissue with a #15 scalpel blade.  The skin margins were undermined to an appropriate distance in all directions utilizing iris scissors.

## 2021-12-20 ENCOUNTER — APPOINTMENT (OUTPATIENT)
Dept: INTERNAL MEDICINE | Facility: CLINIC | Age: 41
End: 2021-12-20
Payer: MEDICAID

## 2021-12-20 VITALS
RESPIRATION RATE: 16 BRPM | BODY MASS INDEX: 32.18 KG/M2 | TEMPERATURE: 97.9 F | DIASTOLIC BLOOD PRESSURE: 88 MMHG | SYSTOLIC BLOOD PRESSURE: 134 MMHG | HEIGHT: 67 IN | WEIGHT: 205 LBS | OXYGEN SATURATION: 99 % | HEART RATE: 73 BPM

## 2021-12-20 VITALS — SYSTOLIC BLOOD PRESSURE: 122 MMHG | DIASTOLIC BLOOD PRESSURE: 84 MMHG

## 2021-12-20 DIAGNOSIS — R80.9 PROTEINURIA, UNSPECIFIED: ICD-10-CM

## 2021-12-20 DIAGNOSIS — Z23 ENCOUNTER FOR IMMUNIZATION: ICD-10-CM

## 2021-12-20 DIAGNOSIS — M72.2 PLANTAR FASCIAL FIBROMATOSIS: ICD-10-CM

## 2021-12-20 DIAGNOSIS — R10.12 LEFT UPPER QUADRANT PAIN: ICD-10-CM

## 2021-12-20 PROCEDURE — 90686 IIV4 VACC NO PRSV 0.5 ML IM: CPT

## 2021-12-20 PROCEDURE — 99214 OFFICE O/P EST MOD 30 MIN: CPT | Mod: 25

## 2021-12-20 PROCEDURE — G0008: CPT

## 2021-12-20 NOTE — ASSESSMENT
[FreeTextEntry1] : \par advised covid19 shot \par \par dmii and hld and Thyroid meds refilled\par REFER TO OPHTHAL AND PODIATRY \par \par refer to thyroid US again and pt need to see podiatry and dietitian and ophthal and will discussed when pt see me in 12/2021; \par \par pt aware that he need to do this US thyroid before he sees me; task staff to mail the order WHICH PT HAS NOT FDO IT; \par \par \par ABD PAIN WITH GEARD AND EARLY SATIETY AND DYSPEPSIA;\par EXAM SHOWED NO RED FLAG SYMPTOMS\par CHECK LAB AND STOOL \par Check US ABD \par REFER TO GI\par 1 month follow up \par -Cautions to ED and follow back discussed with pt in details\par \par \par \par plantar fasciitis of Right foot \par ice\par topical NSAIDs \par \par -not allergy to egg\par -no fever or chills today\par -had flu shot before and tolerate with prior flu shot \par -tolerate with the procedural\par \par 1 MONTH FOLLOW UP \par

## 2021-12-20 NOTE — HISTORY OF PRESENT ILLNESS
[de-identified] : 41 y.o Mongolian male with PMHx of vitiligo and recently diagnosed hypothyroidism and proteinuria AND Obesity ,DMII, comes in to follow up \par \par \par pt was in ED in 08/31/2020 in [facility] due to leg swelling; \par pt had leg swelling and fatigue and constipation and feeling sleepy for last 6 months; and worsening in last 3 months; \par pt was in [facility] ED and had TSH at 47.10; had HIV negative at 08/27/2020; has not follow up with ENDO as referred;\par \par interval Hx 11/13/2020: \par \par leg swelling resolved s/p on levothyroxine; compliance with his home meds \par has not follow up with us THYROID OR endo \par saw derm FOR VITILIGO and Hidradenitis \par denies of any HA/CP/SOB/Palpitation\par no other compliant \par \par \par interval Hx 02/17/2021: \par \par compliance with all his home meds; \par \par interval hx 10/29/2021\par \par has not follow up since 02/2012;\par has not do US thyroid \par \par interval hx 12/20/2021\par \par has not follow up ONSITE  since 02/2012;\par has not do US thyroid \par \par \par reported that he start feeling early SATIETY and dyspepsia for 6 months ; no nausea or vomiting; no fever or chills;no melena or blood in stool; reported acid relax; taking toms; no weight loss; no fhx of GI ca or IBD; \par \par \par also reported right bottom foot pain for 2-3 months; was standing long hour at work; no injury or trauma or joints swelling \par \par \par ROS\par \par Constitutional:  no fever and no chills. \par Eyes:  no discharge and no pain. \par HEENT:  no earache and no hearing loss. \par Cardiovascular:  no chest pain, no palpitations and no leg claudication. \par Respiratory:  no shortness of breath, no wheezing and no cough. \par Gastrointestinal:  as per HPI\par Genitourinary:  no dysuria and no incontinence. \par Musculoskeletal:  no joint pain, no joint stiffness and no joint swelling. \par Integumentary:  no itching and no mole changes. \par Neurological:  no headache, no dizziness and no fainting. \par Psychiatric:  not suicidal, no insomnia, no anxiety and no depression. \par \par Physical Exam\par \par Constitutional:   no acute distress, well nourished, well developed and well-appearing. \par Eyes:  normal sclera/conjunctiva, pupils equal round and reactive to light and extraocular movements intact. \par ENT:  the outer ears and nose were normal in appearance and the oropharynx was normal. \par Neck:  supple, no lymphadenopathy and the thyroid was normal and there were no nodules present. \par Pulmonary:  no respiratory distress, lungs were clear to auscultation bilaterally, no accessory muscle use. \par Cardiac:  normal rate, with a regular rhythm, normal S1 and S2 and no murmur heard. \par Vascular:  there was no peripheral edema. \par Abdomen:  abdomen soft, mild tenderness when palpate LUQ, non-distended, no abdominal mass palpated, no HSM and normal bowel sounds. \par Lymphatic:  no posterior cervical lymphadenopathy, no anterior cervical lymphadenopathy. \par Back:  no CVA tenderness and no spinal tenderness. \par Musculoskeletal: no joint swelling and grossly normal strength/tone. tenderness when palpate Rt plantar foot; no swelling \par Skin:  no rash. \par Neurology:  normal gait, coordination grossly intact, no focal deficits and deep tendon reflexes were 2+ and symmetric. \par Psychiatric:  the affect was normal, oriented to person, place, and time and insight and judgment were intact.\par

## 2021-12-20 NOTE — HEALTH RISK ASSESSMENT
[No] : In the past 12 months have you used drugs other than those required for medical reasons? No [0] : 2) Feeling down, depressed, or hopeless: Not at all (0) [Hepatitis C test offered] : Hepatitis C test offered [None] : None [With Family] : lives with family [Employed] : employed [] :  [Sexually Active] : sexually active [Feels Safe at Home] : Feels safe at home [Fully functional (bathing, dressing, toileting, transferring, walking, feeding)] : Fully functional (bathing, dressing, toileting, transferring, walking, feeding) [Fully functional (using the telephone, shopping, preparing meals, housekeeping, doing laundry, using] : Fully functional and needs no help or supervision to perform IADLs (using the telephone, shopping, preparing meals, housekeeping, doing laundry, using transportation, managing medications and managing finances) [de-identified] : SMOKE FOR 20 YRS; 1 PACK A DAY; QUIT FOR 9 months ago  [[id number]] : 0 [Change in mental status noted] : No change in mental status noted [Language] : denies difficulty with language [Behavior] : denies difficulty with behavior [Learning/Retaining New Information] : denies difficulty learning/retaining new information [Handling Complex Tasks] : denies difficulty handling complex tasks [Reasoning] : denies difficulty with reasoning [Spatial Ability and Orientation] : denies difficulty with spatial ability and orientation [High Risk Behavior] : no high risk behavior [HIVDate] : 08/2020 [HIVComments] : NEGATIVE  [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

## 2021-12-30 ENCOUNTER — NON-APPOINTMENT (OUTPATIENT)
Age: 41
End: 2021-12-30

## 2021-12-30 LAB
ALBUMIN SERPL ELPH-MCNC: 4.6 G/DL
ALP BLD-CCNC: 82 U/L
ALT SERPL-CCNC: 97 U/L
AMYLASE/CREAT SERPL: 63 U/L
ANION GAP SERPL CALC-SCNC: 12 MMOL/L
AST SERPL-CCNC: 62 U/L
BASOPHILS # BLD AUTO: 0.07 K/UL
BASOPHILS NFR BLD AUTO: 0.7 %
BILIRUB SERPL-MCNC: 0.2 MG/DL
BUN SERPL-MCNC: 12 MG/DL
CALCIUM SERPL-MCNC: 9.8 MG/DL
CHLORIDE SERPL-SCNC: 103 MMOL/L
CHOLEST SERPL-MCNC: 122 MG/DL
CO2 SERPL-SCNC: 26 MMOL/L
CREAT SERPL-MCNC: 0.8 MG/DL
ENDOMYSIUM IGA SER QL: NEGATIVE
ENDOMYSIUM IGA TITR SER: NORMAL
EOSINOPHIL # BLD AUTO: 0.24 K/UL
EOSINOPHIL NFR BLD AUTO: 2.5 %
ESTIMATED AVERAGE GLUCOSE: 166 MG/DL
GLUCOSE SERPL-MCNC: 94 MG/DL
H PYLORI AG STL QL: DETECTED
HBA1C MFR BLD HPLC: 7.4 %
HCT VFR BLD CALC: 49.3 %
HDLC SERPL-MCNC: 41 MG/DL
HEMOCCULT STL QL: NEGATIVE
HGB BLD-MCNC: 15.8 G/DL
IMM GRANULOCYTES NFR BLD AUTO: 0.3 %
LDLC SERPL CALC-MCNC: 61 MG/DL
LPL SERPL-CCNC: 32 U/L
LYMPHOCYTES # BLD AUTO: 3.2 K/UL
LYMPHOCYTES NFR BLD AUTO: 33.1 %
MAN DIFF?: NORMAL
MCHC RBC-ENTMCNC: 28.5 PG
MCHC RBC-ENTMCNC: 32 GM/DL
MCV RBC AUTO: 89 FL
MONOCYTES # BLD AUTO: 0.9 K/UL
MONOCYTES NFR BLD AUTO: 9.3 %
NEUTROPHILS # BLD AUTO: 5.23 K/UL
NEUTROPHILS NFR BLD AUTO: 54.1 %
NONHDLC SERPL-MCNC: 81 MG/DL
PLATELET # BLD AUTO: 184 K/UL
POTASSIUM SERPL-SCNC: 4.4 MMOL/L
PROT SERPL-MCNC: 7.4 G/DL
RBC # BLD: 5.54 M/UL
RBC # FLD: 13.8 %
SODIUM SERPL-SCNC: 141 MMOL/L
T3 SERPL-MCNC: 131 NG/DL
T4 FREE SERPL-MCNC: 1.5 NG/DL
TRIGL SERPL-MCNC: 97 MG/DL
TSH SERPL-ACNC: 3.17 UIU/ML
TTG IGA SER IA-ACNC: <1.2 U/ML
TTG IGA SER-ACNC: NEGATIVE
WBC # FLD AUTO: 9.67 K/UL

## 2021-12-30 RX ORDER — METFORMIN HYDROCHLORIDE 500 MG/1
500 TABLET, COATED ORAL
Qty: 60 | Refills: 2 | Status: DISCONTINUED | COMMUNITY
Start: 2020-10-11 | End: 2021-12-30

## 2021-12-30 RX ORDER — PANTOPRAZOLE SODIUM 40 MG/10ML
40 INJECTION, POWDER, FOR SOLUTION INTRAVENOUS
Qty: 30 | Refills: 2 | Status: DISCONTINUED | COMMUNITY
Start: 2021-12-20 | End: 2021-12-30

## 2022-01-21 ENCOUNTER — APPOINTMENT (OUTPATIENT)
Dept: INTERNAL MEDICINE | Facility: CLINIC | Age: 42
End: 2022-01-21

## 2022-02-07 ENCOUNTER — APPOINTMENT (OUTPATIENT)
Dept: GASTROENTEROLOGY | Facility: CLINIC | Age: 42
End: 2022-02-07

## 2022-02-07 NOTE — ED PROVIDER NOTE - PROGRESS NOTE DETAILS
Physical Therapy    Facility/Department: [address] MED SURG  Initial Assessment    NAME: Di Herrera  : 1954  MRN: [id number]    Date of Service: 2022    Discharge Recommendations:  Home independently   Di Herrera scored a 21/24 on the AM-PAC short mobility form. At this time, no further PT is recommended upon discharge. Recommend patient returns to prior setting with prior services. PT Equipment Recommendations  Equipment Needed: No    Assessment   Assessment: The pt is a 71 yo male who came to the ED after a possible seizure episode. The pt reported having a fall when he lost his balance while getting dressed and when EMS found him he was confused and they thought postictal. CT head was performed in the emergency department shows extensive low-attenuation in the right temporoparietal region suspected for acute or subacute ischemia. The pt reports he lives alone in a 9th floor apartment with elevator access. PTA he was indep in mobility w/o a device, indep in self-care and homemaking and still works a tax prepayor. PMHx: DM, HTN, CVA      Today, the pt presents with good strength in B LE's although he reports having some pain in B feet from neuropathy. He completed bed mobility indep; transfers with SBA and ambulated without a device in the room setting with CGA/SBA. He was a little unsteady initially but this improved with time and no LOB were noted. Anticipate that the pt will be safe for home when medically ready for d/c. Will con't to follow 1-2 more times to ensure a safe transition to home.   Prognosis: Good  Decision Making: Low Complexity  PT Education: PT Role;General Safety  Barriers to Learning: [place]  REQUIRES PT FOLLOW UP: Yes  Activity Tolerance  Activity Tolerance: Patient Tolerated treatment well  Activity Tolerance: orthostatics BP taken and the pt's SpO2 levels on room air were variable from the mid-80's to the mid-90's; no reports of dizziness or SOB with activity       Patient Diagnosis(es): The primary encounter diagnosis was Syncope and collapse. Diagnoses of Hypoxia and Lactic acidosis were also pertinent to this visit. has a past medical history of Diabetes mellitus ([facility] [address] Hypertension, and Stroke ([facility] [address] has no past surgical history on file. Restrictions  Restrictions/Precautions  Restrictions/Precautions: Up as Tolerated,Seizure,Fall Risk  Vision/Hearing  Vision: Impaired  Vision Exceptions: Wears glasses for reading  Hearing: Exceptions to [facility]  Hearing Exceptions: Hard of hearing/hearing concerns; No hearing aid     Subjective  General  Chart Reviewed: Yes  Additional Pertinent Hx: Per H&P on 2022 of Malaika Valladares MD: The pt is a 69 yo male who came to the ED after a possible seizure episode. The pt reported having a fall when he lost his balance while getting dressed and when EMS found him he was confused and they thought postictal. CT head was performed in the emergency department shows extensive low-attenuation in the right temporoparietal region suspected for acute or subacute ischemia.      PMHx: DM, HTN, CVA  Response To Previous Treatment: Not applicable  Family / Caregiver Present: No  Referring Practitioner: Malaika Valladares MD  Referral Date : 22  Diagnosis: Syncope and collapse, rule out seizure  Follows Commands: Impaired  Other (Comment): he is [place] and needed repeated questions to understand and then was able to follow directions  Subjective  Subjective: the pt was found to be laying supine in the bed; he was agreeable to therapy and did not report pain  Vital Signs  Orthostatic B/P and Pulse?: Yes  Blood Pressure Lyin/83  Pulse Lyin PER MINUTE  Blood Pressure Sittin/73  Pulse Sittin PER MINUTE  Blood Pressure Standin/78  Pulse Standin PER MINUTE  Orthostatic B/P and Pulse?: Yes       Orientation  Orientation  Overall Orientation Status: Within Functional Limits  Social/Functional Good  Standing - Static: Good (at the sink)  Standing - Dynamic: Good; - (while walking)  Comments: the pt stood at the sink to brush teeth and comb hair x 2-3 minutes with SBA        Plan   Plan  Times per week: 1-2x more  Current Treatment Recommendations: Transfer Training,Gait Training,Safety Education & Training,Patient/Caregiver Education & Training  Safety Devices  Type of devices: Call light within reach,Chair alarm in place,Gait belt,Patient at risk for falls,Left in chair,Nurse notified      AM-PAC Score  AM-PAC Inpatient Mobility Raw Score : 21 (02/07/22 0914)  AM-PAC Inpatient T-Scale Score : 50.25 (02/07/22 0914)  Mobility Inpatient CMS 0-100% Score: 28.97 (02/07/22 0914)  Mobility Inpatient CMS G-Code Modifier : Franny Myers (02/07/22 0914)          Goals  Short term goals  Time Frame for Short term goals: upon d/c  Short term goal 1: Transfers sit <> stand with mod I. Short term goal 2: Ambulate without a device 100 feet with SBA/Sup.   Patient Goals   Patient goals : to go home       Therapy Time   Individual Concurrent Group Co-treatment   Time In 0828         Time Out 0907         Minutes 39                 Electronically signed by Starla Dodson, PT [id number] on 2/7/2022 at 9:21 AM pt reexamined, feels better, labs wnl except TSH-47. Pt w fatigue and LE edema. Stable for DC. Emphasized need for Endocrine evaluation ASAP, provided f/u numbers. Will DC

## 2022-02-14 ENCOUNTER — LABORATORY RESULT (OUTPATIENT)
Age: 42
End: 2022-02-14

## 2022-02-16 ENCOUNTER — APPOINTMENT (OUTPATIENT)
Dept: INTERNAL MEDICINE | Facility: CLINIC | Age: 42
End: 2022-02-16

## 2022-02-16 LAB
ALBUMIN SERPL ELPH-MCNC: 4.8 G/DL
ALP BLD-CCNC: 92 U/L
ALT SERPL-CCNC: 77 U/L
ANION GAP SERPL CALC-SCNC: 15 MMOL/L
AST SERPL-CCNC: 50 U/L
BILIRUB SERPL-MCNC: 0.3 MG/DL
BUN SERPL-MCNC: 15 MG/DL
CALCIUM SERPL-MCNC: 9.7 MG/DL
CHLORIDE SERPL-SCNC: 104 MMOL/L
CO2 SERPL-SCNC: 22 MMOL/L
CREAT SERPL-MCNC: 0.79 MG/DL
FERRITIN SERPL-MCNC: 335 NG/ML
GLUCOSE SERPL-MCNC: 80 MG/DL
IRON SATN MFR SERPL: 21 %
IRON SERPL-MCNC: 75 UG/DL
POTASSIUM SERPL-SCNC: 4.3 MMOL/L
PROT SERPL-MCNC: 7.4 G/DL
SODIUM SERPL-SCNC: 141 MMOL/L
TIBC SERPL-MCNC: 359 UG/DL
TRANSFERRIN SERPL-MCNC: 307 MG/DL
UIBC SERPL-MCNC: 284 UG/DL

## 2022-02-23 NOTE — ASSESSMENT
[FreeTextEntry1] : \par advised covid19 shot \par \par dmii and hld and Thyroid meds refilled\par REFER TO OPHTHAL AND PODIATRY \par \par refer to thyroid US again and pt need to see podiatry and dietitian and ophthal and will discussed when pt see me in 12/2021; \par \par pt aware that he need to do this US thyroid before he sees me; task staff to mail the order WHICH PT HAS NOT FDO IT; \par \par \par ABD PAIN WITH GEARD AND EARLY SATIETY AND DYSPEPSIA;\par EXAM SHOWED NO RED FLAG SYMPTOMS\par s/p LAB AND STOOL \par s/p treatment fro h.pylori\par US ABD done \par REFER TO GI again \par 1 month follow up \par -Cautions to ED and follow back discussed with pt in details\par \par \par \par plantar fasciitis of Right foot \par ice\par topical NSAIDs \par \par \par 1 MONTH FOLLOW UP onsite for routine lab and repeat h. pylori\par \par I spend a total of 15 minutes on the date of the encounter evaluating and treating patient\par \par

## 2022-02-23 NOTE — HEALTH RISK ASSESSMENT
[No] : In the past 12 months have you used drugs other than those required for medical reasons? No [0] : 2) Feeling down, depressed, or hopeless: Not at all (0) [Hepatitis C test offered] : Hepatitis C test offered [None] : None [With Family] : lives with family [Employed] : employed [] :  [Sexually Active] : sexually active [Feels Safe at Home] : Feels safe at home [Fully functional (bathing, dressing, toileting, transferring, walking, feeding)] : Fully functional (bathing, dressing, toileting, transferring, walking, feeding) [Fully functional (using the telephone, shopping, preparing meals, housekeeping, doing laundry, using] : Fully functional and needs no help or supervision to perform IADLs (using the telephone, shopping, preparing meals, housekeeping, doing laundry, using transportation, managing medications and managing finances) [de-identified] : SMOKE FOR 20 YRS; 1 PACK A DAY; QUIT FOR 9 months ago  [[id number]] : 0 [Change in mental status noted] : No change in mental status noted [Language] : denies difficulty with language [Behavior] : denies difficulty with behavior [Learning/Retaining New Information] : denies difficulty learning/retaining new information [Handling Complex Tasks] : denies difficulty handling complex tasks [Reasoning] : denies difficulty with reasoning [Spatial Ability and Orientation] : denies difficulty with spatial ability and orientation [High Risk Behavior] : no high risk behavior [HIVDate] : 08/2020 [HIVComments] : NEGATIVE  [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

## 2022-02-23 NOTE — HISTORY OF PRESENT ILLNESS
[de-identified] : This visit was provided via TELEPHONE. The patient, SAMM HURST , was located at home,[address] [place] [address]\[place] [address] , at the time of the visit. \par The provider,RANJEET BISHOP , was located at his medical office located in  at the time of the visit. The patient, and Provider participated in the TELEPHONE\par Verbal consent given on Feb 16 2022  9:00AM by the patient.\par \par \par \par 41 y.o Iceland[place] male with PMHx of vitiligo and recently diagnosed hypothyroidism and proteinuria AND Obesity ,DMII, comes in to follow up \par \par \par pt was in ED in 08/31/2020 in [facility] due to leg swelling; \par pt had leg swelling and fatigue and constipation and feeling sleepy for last 6 months; and worsening in last 3 months; \par pt was in [facility] ED and had TSH at 47.10; had HIV negative at 08/27/2020; has not follow up with ENDO as referred;\par \par interval Hx 11/13/2020: \par \par leg swelling resolved s/p on levothyroxine; compliance with his home meds \par has not follow up with us THYROID OR endo \par saw derm FOR VITILIGO and Hidradenitis \par denies of any HA/CP/SOB/Palpitation\par no other compliant \par \par \par interval Hx 02/17/2021: \par \par compliance with all his home meds; \par \par interval hx 10/29/2021\par \par has not follow up since 02/2012;\par has not do US thyroid \par \par interval hx 12/20/2021\par \par has not follow up ONSITE  since 02/2012;\par has not do US thyroid \par \par \par reported that he start feeling early SATIETY and dyspepsia for 6 months ; no nausea or vomiting; no fever or chills;no melena or blood in stool; reported acid relax; taking toms; no weight loss; no fhx of GI ca or IBD; \par \par \par also reported right bottom foot pain for 2-3 months; was standing long hour at work; no injury or trauma or joints swelling \par \par \par interval hx 02/16/2022\par \par LFT still high but stable\par \par has not see GI or opthal or podiatry yet

## 2022-03-11 ENCOUNTER — RX RENEWAL (OUTPATIENT)
Age: 42
End: 2022-03-11

## 2022-03-25 ENCOUNTER — APPOINTMENT (OUTPATIENT)
Dept: INTERNAL MEDICINE | Facility: CLINIC | Age: 42
End: 2022-03-25
Payer: MEDICAID

## 2022-03-25 VITALS
TEMPERATURE: 98.2 F | WEIGHT: 206 LBS | DIASTOLIC BLOOD PRESSURE: 77 MMHG | SYSTOLIC BLOOD PRESSURE: 111 MMHG | BODY MASS INDEX: 32.33 KG/M2 | RESPIRATION RATE: 16 BRPM | HEIGHT: 67 IN | OXYGEN SATURATION: 96 % | HEART RATE: 78 BPM

## 2022-03-25 DIAGNOSIS — R68.81 EARLY SATIETY: ICD-10-CM

## 2022-03-25 DIAGNOSIS — R10.13 EPIGASTRIC PAIN: ICD-10-CM

## 2022-03-25 PROCEDURE — 99214 OFFICE O/P EST MOD 30 MIN: CPT

## 2022-03-28 NOTE — HEALTH RISK ASSESSMENT
[No] : In the past 12 months have you used drugs other than those required for medical reasons? No [0] : 2) Feeling down, depressed, or hopeless: Not at all (0) [Hepatitis C test offered] : Hepatitis C test offered [None] : None [With Family] : lives with family [Employed] : employed [] :  [Sexually Active] : sexually active [Feels Safe at Home] : Feels safe at home [Fully functional (bathing, dressing, toileting, transferring, walking, feeding)] : Fully functional (bathing, dressing, toileting, transferring, walking, feeding) [Fully functional (using the telephone, shopping, preparing meals, housekeeping, doing laundry, using] : Fully functional and needs no help or supervision to perform IADLs (using the telephone, shopping, preparing meals, housekeeping, doing laundry, using transportation, managing medications and managing finances) [de-identified] : SMOKE FOR 20 YRS; 1 PACK A DAY; QUIT FOR 2 yrs ago  [[id number]] : 0 [Change in mental status noted] : No change in mental status noted [Language] : denies difficulty with language [Behavior] : denies difficulty with behavior [Learning/Retaining New Information] : denies difficulty learning/retaining new information [Handling Complex Tasks] : denies difficulty handling complex tasks [Reasoning] : denies difficulty with reasoning [Spatial Ability and Orientation] : denies difficulty with spatial ability and orientation [High Risk Behavior] : no high risk behavior [HIVDate] : 08/2020 [HIVComments] : NEGATIVE  [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

## 2022-03-28 NOTE — HISTORY OF PRESENT ILLNESS
[de-identified] : 42 y.o Persian male with PMHx of vitiligo and recently diagnosed hypothyroidism and proteinuria AND Obesity ,DMII, comes in to follow up \par \par \par pt was in ED in 08/31/2020 in [facility] due to leg swelling; \par pt had leg swelling and fatigue and constipation and feeling sleepy for last 6 months; and worsening in last 3 months; \par pt was in [facility] ED and had TSH at 47.10; had HIV negative at 08/27/2020; has not follow up with ENDO as referred;\par \par interval Hx 11/13/2020: \par \par leg swelling resolved s/p on levothyroxine; compliance with his home meds \par has not follow up with us THYROID OR endo \par saw derm FOR VITILIGO and Hidradenitis \par denies of any HA/CP/SOB/Palpitation\par no other compliant \par \par \par interval Hx 02/17/2021: \par \par compliance with all his home meds; \par \par interval hx 10/29/2021\par \par has not follow up since 02/2012;\par has not do US thyroid \par \par interval hx 12/20/2021\par \par has not follow up ONSITE  since 02/2012;\par has not do US thyroid \par \par \par reported that he start feeling early SATIETY and dyspepsia for 6 months ; no nausea or vomiting; no fever or chills;no melena or blood in stool; reported acid relax; taking toms; no weight loss; no fhx of GI ca or IBD; \par \par \par also reported right bottom foot pain for 2-3 months; was standing long hour at work; no injury or trauma or joints swelling \par \par \par interval hx 02/16/2022\par \par LFT still high but stable\par \par has not see GI or opthal or podiatry yet \par \par \par interval hx 03/25/2022\par HAS NOT FOLLOW UP WITH gi OR OPTHAL OR PODIATRY YET

## 2022-03-28 NOTE — ASSESSMENT
[FreeTextEntry1] : \par advised covid19 shot \par \par dmii and hld and Thyroid meds refilled\par REFER TO OPHTHAL AND PODIATRY \par \par refer to thyroid US grossly wnl in 01/2022  again and pt need to see podiatry and dietitian and ophthal and will discussed when pt see me in 12/2021; \par \par pt aware that he need to do this US thyroid before he sees me; task staff to mail the order WHICH PT HAS NOT FDO IT; \par \par \par ABD PAIN WITH GERD AND EARLY SATIETY AND DYSPEPSIA;\par EXAM SHOWED NO RED FLAG SYMPTOMS\par s/p LAB AND STOOL \par s/p treatment fro h.pylori\par US ABD done \par REFER TO GI again \par \par \par \par plantar fasciitis of Right foot \par ice\par topical NSAIDs \par \par \par 3 months follow up \par \par \par

## 2022-03-30 ENCOUNTER — APPOINTMENT (OUTPATIENT)
Dept: GASTROENTEROLOGY | Facility: CLINIC | Age: 42
End: 2022-03-30
Payer: MEDICAID

## 2022-03-30 VITALS
OXYGEN SATURATION: 97 % | BODY MASS INDEX: 31.86 KG/M2 | TEMPERATURE: 97.5 F | SYSTOLIC BLOOD PRESSURE: 108 MMHG | DIASTOLIC BLOOD PRESSURE: 75 MMHG | HEIGHT: 67 IN | WEIGHT: 203 LBS | HEART RATE: 79 BPM

## 2022-03-30 DIAGNOSIS — A04.8 OTHER SPECIFIED BACTERIAL INTESTINAL INFECTIONS: ICD-10-CM

## 2022-03-30 DIAGNOSIS — K21.9 GASTRO-ESOPHAGEAL REFLUX DISEASE W/OUT ESOPHAGITIS: ICD-10-CM

## 2022-03-30 DIAGNOSIS — Z01.818 ENCOUNTER FOR OTHER PREPROCEDURAL EXAMINATION: ICD-10-CM

## 2022-03-30 PROCEDURE — 99204 OFFICE O/P NEW MOD 45 MIN: CPT

## 2022-03-30 NOTE — HISTORY OF PRESENT ILLNESS
[None] : had no significant interval events [Nausea] : denies nausea [Vomiting] : denies vomiting [Constipation] : denies constipation [Yellow Skin Or Eyes (Jaundice)] : denies jaundice [Abdominal Pain] : denies abdominal pain [Rectal Pain] : denies rectal pain [Heartburn] : heartburn [Diarrhea] : diarrhea [GERD] : gastroesophageal reflux disease [Abdominal Swelling] : abdominal swelling [Wt Gain ___ Lbs] : no recent weight gain [Wt Loss ___ Lbs] : no recent weight loss [Hiatus Hernia] : no hiatus hernia [Peptic Ulcer Disease] : no peptic ulcer disease [Pancreatitis] : no pancreatitis [Cholelithiasis] : no cholelithiasis [Kidney Stone] : no kidney stone [Inflammatory Bowel Disease] : no inflammatory bowel disease [Irritable Bowel Syndrome] : no irritable bowel syndrome [Diverticulitis] : no diverticulitis [Alcohol Abuse] : no alcohol abuse [Malignancy] : no malignancy [Abdominal Surgery] : no abdominal surgery [Appendectomy] : no appendectomy [Cholecystectomy] : no cholecystectomy [de-identified] : The patient is a 41-year-old male with past medical history significant for hypercholesterolemia, diabetes mellitus, hypothyroidism who was referred to my office by Dr. Micky Deng for dyspepsia and gastroesophageal reflux disease. The patient also admits to having diarrhea, change in bowel habits and change in caliber of stool. I was asked to render an opinion for consultation for the above complaints.   The patient states that he is feeling fine.  The patient denies any abdominal pain.  The patient complains of abdominal gas and bloating.  The patient denies any nausea or vomiting.  The patient complains of gastroesophageal reflux disease but denies any dysphagia. The gastroesophageal reflux disease is worse after meals and late at night and in the early morning.  The gastroesophageal reflux disease is improved with proton pump inhibitors, H2 blockers and antacids.  The patient denies any atypical chest pain, shortness of breath or palpitations.  The patient denies any diaphoresis. The patient complains of diarrhea but denies any constipation.  The patient has 1 to 2 bowel movements a day. The diarrhea is described as soft to watery in nature.   The patient complains of a change in bowel habits.  The patient complains of a change in caliber of stool.   The patient denies having mucus discharge with the bowel movements.  The patient denies any bright red blood per rectum, melena or hematemesis.  The patient denies any rectal pain or rectal pruritus. The patient denies any weight loss or anorexia.  He denies any fevers or chills.  The patient denies any jaundice or pruritus.  The patient denies any back pain.  The abdominal ultrasound performed on January 3, 2022 revealed increased echogenicity of the liver parenchyma that is nonspecific but may represent fatty infiltration versus chronic hepatocellular disease.  There is no focal liver lesions identified. The blood work performed on February 14, 2022 revealed abnormal liver enzymes with an elevated AST/ALT of 50/77 U/L, respectively, a normal alkaline phosphatase of 92 U/L, and a normal total bilirubin of 0.3 mg/dL, a normal serum iron level of 75 mcg/dL, a normal TIBC/U IBC of 359/284 mcg/dL, respectively, a normal iron percent saturation of 21%, a normal ferritin level of 335 ng/mL, a normal transferrin level of 370 mg/dL.  The stool guaiac performed on December 30, 2021 was negative for occult blood in the stool.  The H. pylori antigen stool was detected on December 30, 2021. The patient denies ever having a prior upper endoscopy and colonoscopy performed by another gastroenterologist.  The patient denies any significant family history of GI problems.   [de-identified] : (+) prior smoking 1 PPD x 15 to 20 years stopped x 2 years, (-) ETOH, (-) IVDA\par

## 2022-03-30 NOTE — REVIEW OF SYSTEMS
[Feeling Tired] : feeling tired [Diarrhea] : diarrhea [Heartburn] : heartburn [Nocturia] : nocturia [Negative] : Heme/Lymph

## 2022-03-30 NOTE — ASSESSMENT
[FreeTextEntry1] : Dyspepsia: The patient complains of dyspeptic symptoms.  The patient was advised to abide by an anti-gas (low FOD-MAP) diet.  The patient was given a pamphlet for anti-gas (low FOD-MAP).  The patient and I reviewed the anti-gas (low FOD-MAP) diet at length. The patient is to start on a trial of Simethicone one tablet 4 times a day p.r.n. abdominal pain and gas.\par GERD: The patient was advised to avoid late-night meals and dietary indiscretions.  The patient was advised to avoid fried and fatty foods.  The patient was advised to abide by an anti-GERD diet. The patient was given a pamphlet for anti-GERD.  The patient and I reviewed the anti-GERD diet at length. I recommend a trial of Pantoprazole 40 mg once a day x 3 months for the symptoms.\par Diarrhea: The patient complains of diarrhea.  I recommend a low residue diet. The patient is to avoid fiber supplementation. The patient is to consider starting a trial of a probiotic such as Align once a day.    The patient agreed and will follow-up to reassess the symptoms.  \par Fatty Liver:  The patient had an imaging study suggestive of fatty infiltration of the liver.  The patient denies any jaundice or pruritus.  The patient denies any alcohol use.  The patient denies taking large doses of nonsteroidal anti-inflammatory drugs or acetaminophen.  The findings are suggestive of fatty liver.  The patient and I had a long discussion regarding the risks of fatty liver and possible progression to cirrhosis.  The patient was told of the possible increased risk of developing liver failure, cirrhosis, ascites, GI bleeding secondary to varices, hepatic encephalopathy, bleeding tendencies and liver cancer.  The patient was told of the importance of follow-up.  The patient was advised to follow up every 6 months for blood work and imaging studies. The patient agreed and will follow up. The patient was advised to lose weight and exercise.   The patient was advised to abide by a Mediterranean diet. I recommend a trial of vitamin E supplementation for the fatty liver.  If the liver enzymes remain elevated, the patient may require a trial of Pioglitazone for the fatty liver. I recommend avoid alcohol and hepato-toxic agents.  The patient was also advised to avoid NSAIDs, Acetaminophen and any other hepatotoxic drugs. The patient was also advised not to share needles, razors, scissors, nail clippers, etc.. The patient is to continue close follow-up in our office for blood work and exams.  If the liver enzymes remain elevated, the patient may require a CT guided liver biopsy to assess the liver parenchyma for possible treatment.  We had a long discussion regarding the risks and benefits of the procedure.  The patient was told of the risks of bleeding, perforation, infections, emergency surgery and missing lesions.  The patient agreed and will follow-up to reassess the symptoms.  \par Upper Endoscopy: I recommend an upper endoscopy to assess for peptic ulcer disease versus esophagitis.  The patient was told of the risks and benefits of the procedure.  The patient was told of the risks of perforation, emergency surgery, bleeding, infections and missed lesions. The patient is to have a COVID 19 PCR nasopharyngeal swab performed at the approved sites 3 days prior to the procedure.  The patient is told not to drive, drink alcohol, use recreational drugs, exercise, or work the day of the procedure.  The patient was told of the need for an escort to accompany the patient home after the procedure. The patient is aware that the procedure may be cancelled if they fail to follow the directions.  The patient agreed and will schedule for the procedure. The patient is to hold the diabetic medication the day the morning of the procedure. The patient is to be n.p.o. after midnight.  The patient is to return for the procedure.\par History of H. Pylori: The patient was found to have a positive Helicobacter pylori stool antigen performed on December 27, 2021.  The patient was treated with amoxicillin, clarithromycin and omeprazole for 2 weeks for H. pylori eradication.  The patient will have a repeat upper endoscopy to reassess for H. pylori infection and for peptic ulcer disease.  The patient agrees and will follow up.\par Follow-up: The patient is to follow-up in the office in 4 weeks to reassess the symptoms. The patient was told to call the office if any further problems. \par \par \par

## 2022-04-13 ENCOUNTER — APPOINTMENT (OUTPATIENT)
Dept: HEPATOLOGY | Facility: CLINIC | Age: 42
End: 2022-04-13

## 2022-05-08 LAB — SARS-COV-2 N GENE NPH QL NAA+PROBE: NOT DETECTED

## 2022-05-10 ENCOUNTER — RESULT REVIEW (OUTPATIENT)
Age: 42
End: 2022-05-10

## 2022-05-10 ENCOUNTER — APPOINTMENT (OUTPATIENT)
Dept: GASTROENTEROLOGY | Facility: HOSPITAL | Age: 42
End: 2022-05-10

## 2022-05-10 ENCOUNTER — OUTPATIENT (OUTPATIENT)
Dept: OUTPATIENT SERVICES | Facility: HOSPITAL | Age: 42
LOS: 1 days | End: 2022-05-10
Payer: MEDICAID

## 2022-05-10 ENCOUNTER — TRANSCRIPTION ENCOUNTER (OUTPATIENT)
Age: 42
End: 2022-05-10

## 2022-05-10 DIAGNOSIS — A04.8 OTHER SPECIFIED BACTERIAL INTESTINAL INFECTIONS: ICD-10-CM

## 2022-05-10 DIAGNOSIS — K21.9 GASTRO-ESOPHAGEAL REFLUX DISEASE WITHOUT ESOPHAGITIS: ICD-10-CM

## 2022-05-10 LAB — GLUCOSE BLDC GLUCOMTR-MCNC: 86 MG/DL — SIGNIFICANT CHANGE UP (ref 70–99)

## 2022-05-10 PROCEDURE — 43239 EGD BIOPSY SINGLE/MULTIPLE: CPT

## 2022-05-10 PROCEDURE — 82962 GLUCOSE BLOOD TEST: CPT

## 2022-05-10 PROCEDURE — 88312 SPECIAL STAINS GROUP 1: CPT | Mod: 26

## 2022-05-10 PROCEDURE — 88305 TISSUE EXAM BY PATHOLOGIST: CPT

## 2022-05-10 PROCEDURE — 88305 TISSUE EXAM BY PATHOLOGIST: CPT | Mod: 26

## 2022-05-10 PROCEDURE — 88312 SPECIAL STAINS GROUP 1: CPT

## 2022-05-10 DEVICE — CATH ESOPH DIL 9 ATM 6FR 8-10MM: Type: IMPLANTABLE DEVICE | Status: FUNCTIONAL

## 2022-05-10 DEVICE — CATH BALLOON DIL 6-8MM: Type: IMPLANTABLE DEVICE | Status: FUNCTIONAL

## 2022-05-10 DEVICE — CATH ESOPH DIL 8 ATM 6FR10-12M: Type: IMPLANTABLE DEVICE | Status: FUNCTIONAL

## 2022-05-12 ENCOUNTER — NON-APPOINTMENT (OUTPATIENT)
Age: 42
End: 2022-05-12

## 2022-05-12 LAB — SURGICAL PATHOLOGY STUDY: SIGNIFICANT CHANGE UP

## 2022-05-25 ENCOUNTER — APPOINTMENT (OUTPATIENT)
Dept: HEPATOLOGY | Facility: CLINIC | Age: 42
End: 2022-05-25
Payer: MEDICAID

## 2022-05-25 VITALS
HEIGHT: 67 IN | WEIGHT: 200 LBS | DIASTOLIC BLOOD PRESSURE: 84 MMHG | RESPIRATION RATE: 16 BRPM | SYSTOLIC BLOOD PRESSURE: 135 MMHG | BODY MASS INDEX: 31.39 KG/M2 | HEART RATE: 79 BPM | OXYGEN SATURATION: 99 % | TEMPERATURE: 98.2 F

## 2022-05-25 DIAGNOSIS — E66.9 OBESITY, UNSPECIFIED: ICD-10-CM

## 2022-05-25 DIAGNOSIS — Z87.891 PERSONAL HISTORY OF NICOTINE DEPENDENCE: ICD-10-CM

## 2022-05-25 DIAGNOSIS — R74.8 ABNORMAL LEVELS OF OTHER SERUM ENZYMES: ICD-10-CM

## 2022-05-25 PROCEDURE — 99214 OFFICE O/P EST MOD 30 MIN: CPT

## 2022-05-25 RX ORDER — L.ACID,FERM,PLA,RHA/B.BIF,LONG 126 MG
TABLET, DELAYED AND EXTENDED RELEASE ORAL
Qty: 28 | Refills: 0 | Status: DISCONTINUED | COMMUNITY
Start: 2021-12-30 | End: 2022-05-25

## 2022-05-25 RX ORDER — CHLORHEXIDINE GLUCONATE 213 G/1000ML
4 SOLUTION TOPICAL
Qty: 1 | Refills: 4 | Status: DISCONTINUED | COMMUNITY
Start: 2020-10-15 | End: 2022-05-25

## 2022-05-25 RX ORDER — OMEPRAZOLE 20 MG/1
20 CAPSULE, DELAYED RELEASE ORAL TWICE DAILY
Qty: 28 | Refills: 0 | Status: DISCONTINUED | COMMUNITY
Start: 2021-12-30 | End: 2022-05-25

## 2022-05-25 RX ORDER — TRIAMCINOLONE ACETONIDE 1 MG/G
0.1 OINTMENT TOPICAL
Qty: 80 | Refills: 1 | Status: DISCONTINUED | COMMUNITY
Start: 2020-10-15 | End: 2022-05-25

## 2022-05-25 RX ORDER — DICLOFENAC SODIUM 1% 10 MG/G
1 GEL TOPICAL
Qty: 2 | Refills: 1 | Status: DISCONTINUED | COMMUNITY
Start: 2021-12-20 | End: 2022-05-25

## 2022-05-25 RX ORDER — CLARITHROMYCIN 500 MG/1
500 TABLET, FILM COATED ORAL TWICE DAILY
Qty: 28 | Refills: 0 | Status: DISCONTINUED | COMMUNITY
Start: 2021-12-30 | End: 2022-05-25

## 2022-05-25 RX ORDER — AMOXICILLIN 500 MG/1
500 TABLET, FILM COATED ORAL TWICE DAILY
Qty: 56 | Refills: 0 | Status: DISCONTINUED | COMMUNITY
Start: 2021-12-30 | End: 2022-05-25

## 2022-05-30 LAB
A1AT SERPL-MCNC: 131 MG/DL
ALBUMIN SERPL ELPH-MCNC: 4.7 G/DL
ALP BLD-CCNC: 68 U/L
ALT SERPL-CCNC: 63 U/L
ANA SER IF-ACNC: NEGATIVE
ANION GAP SERPL CALC-SCNC: 14 MMOL/L
AST SERPL-CCNC: 40 U/L
BASOPHILS # BLD AUTO: 0.06 K/UL
BASOPHILS NFR BLD AUTO: 0.7 %
BILIRUB DIRECT SERPL-MCNC: 0.1 MG/DL
BILIRUB INDIRECT SERPL-MCNC: 0.2 MG/DL
BILIRUB SERPL-MCNC: 0.3 MG/DL
BUN SERPL-MCNC: 15 MG/DL
CALCIUM SERPL-MCNC: 9.7 MG/DL
CERULOPLASMIN SERPL-MCNC: 23 MG/DL
CHLORIDE SERPL-SCNC: 101 MMOL/L
CO2 SERPL-SCNC: 23 MMOL/L
CREAT SERPL-MCNC: 0.83 MG/DL
DEPRECATED KAPPA LC FREE/LAMBDA SER: 1.25 RATIO
EGFR: 111 ML/MIN/1.73M2
EOSINOPHIL # BLD AUTO: 0.19 K/UL
EOSINOPHIL NFR BLD AUTO: 2.3 %
FERRITIN SERPL-MCNC: 333 NG/ML
GLUCOSE SERPL-MCNC: 75 MG/DL
HBV CORE IGG+IGM SER QL: NONREACTIVE
HBV SURFACE AB SER QL: NONREACTIVE
HBV SURFACE AG SER QL: NONREACTIVE
HCT VFR BLD CALC: 45.9 %
HCV AB SER QL: NONREACTIVE
HCV S/CO RATIO: 0.1 S/CO
HEPATITIS A IGG ANTIBODY: REACTIVE
HGB BLD-MCNC: 14.7 G/DL
HIV1+2 AB SPEC QL IA.RAPID: NONREACTIVE
IGA SER QL IEP: 232 MG/DL
IGG SER QL IEP: 1501 MG/DL
IGM SER QL IEP: 56 MG/DL
IMM GRANULOCYTES NFR BLD AUTO: 0.2 %
INR PPP: 1.03 RATIO
IRON SERPL-MCNC: 72 UG/DL
KAPPA LC CSF-MCNC: 1.69 MG/DL
KAPPA LC SERPL-MCNC: 2.11 MG/DL
LKM AB SER QL IF: <20.1 UNITS
LYMPHOCYTES # BLD AUTO: 3.01 K/UL
LYMPHOCYTES NFR BLD AUTO: 36.2 %
MAN DIFF?: NORMAL
MCHC RBC-ENTMCNC: 28.7 PG
MCHC RBC-ENTMCNC: 32 GM/DL
MCV RBC AUTO: 89.5 FL
MITOCHONDRIA AB SER IF-ACNC: NORMAL
MONOCYTES # BLD AUTO: 0.86 K/UL
MONOCYTES NFR BLD AUTO: 10.3 %
NEUTROPHILS # BLD AUTO: 4.18 K/UL
NEUTROPHILS NFR BLD AUTO: 50.3 %
PLATELET # BLD AUTO: 206 K/UL
POTASSIUM SERPL-SCNC: 4.3 MMOL/L
PROT SERPL-MCNC: 7.2 G/DL
PT BLD: 12.2 SEC
RBC # BLD: 5.13 M/UL
RBC # FLD: 14.1 %
SMOOTH MUSCLE AB SER QL IF: ABNORMAL
SODIUM SERPL-SCNC: 139 MMOL/L
TTG IGA SER IA-ACNC: <1.2 U/ML
TTG IGA SER-ACNC: NEGATIVE
WBC # FLD AUTO: 8.32 K/UL

## 2022-05-30 NOTE — HISTORY OF PRESENT ILLNESS
[Autoimmune Disorder] : autoimmune disorder [Needlestick Exposure] : no needlestick exposure [Infected Sexual Partner] : no infected sexual partner [IV Drug Use] : no IV drug use [Tattoo] : no tattoos [Body Piercing] : no body piercing [Hemodialysis] : no hemodialysis [Transfusion before 1992] : no transfusion before 1992 [Transplant before 1992] : no transplant before 1992 [Incarceration] : no incarceration [Alcohol Abuse] : no alcohol abuse [Household Contact to HBV] : no household contact to HBV [Cocaine Use] : no cocaine use [de-identified] : Born in [facility], last international travel mid 2021; works as  [FreeTextEntry1] : 41 yo obese (BMI 31) Male with Hx of HLD, DM Type II (HbA1c 7.1), hypothyroidism, proteinuria, GERD and dyspepsia, s/p EGD 5/10/22 (path: minimal to mild chronic gastritis, neg HP) is here for initial evaluation for mild transaminase elevation. \par Denies OTC meds, herbal supplements, workout supplements.

## 2022-05-30 NOTE — ASSESSMENT
[FreeTextEntry1] : 43 yo obese (BMI 31) Male with Hx of HLD, DM Type II (HbA1c 7.1), hypothyroidism, proteinuria, GERD and dyspepsia, s/p EGD 5/10/22 (path: minimal to mild chronic gastritis, neg HP) is here for initial evaluation for mild transaminase elevation. \par \par \par Patient is seen for abnormal liver chemistries that have been noted since 12/2021.The pattern of elevated liver enzymes is hepatocellular  and mild (~2x ULN), with normal cholestatic parameters and intact synthetic function.  Patient is without prior history of liver disease and denies family history of liver disease or cancer. Patient denies alcohol consumption, any new prescription or OTC medications, any herbal supplements, any iv. or other drug use.  BMI is 31, without significant change, but has metabolic risk factors (HLD, DM). Denies risk factors for viral hepatitis. No stigmata of advanced chronic liver disease.\par \par - Discussed differential Dx, likely NAFLD, but need to rule out other causes. \par - Will obtain hepatitis serology (anti-HCV, HBsAg, HBcAb, HBsAb), iron studies (ferritin, transferrin saturation), IgG, ANNETTE, anti-SMA ab, alpha-1-antitrypsin, ceruloplasmin, tTG-IgA, HIV. \par - US abd 1/2022 showed echogenic liver, otherwise was unremarkable. Low suspicion for hepatic/portal vein thrombosis.\par - Will obtain FibroScan\par - Will check whether immune to HAV\par - Counseled on avoiding alcohol, smoking cessation, avoiding herbal supplements, healthy lifestyle.\par - Nutritionist referral placed. \par \par RTC 1 month\par \par Addendum:\par Hep A immune\par Hep B not immune, not exposed\par Hep C neg\par HIV neg\par A1AT wNL\par Ferritin 333, iron wNL\par Ceruloplasmin 23\par ANNETTE neg, SMA 1:20, AMA neg, IgG and IgM WNL\par tTG IgA neg\par Liver enzymes improved, ALT remains ~1.5-2x ULN\par

## 2022-05-30 NOTE — PHYSICAL EXAM
[General Appearance - Alert] : alert [General Appearance - In No Acute Distress] : in no acute distress [General Appearance - Well Nourished] : well nourished [General Appearance - Well Developed] : well developed [Sclera] : the sclera and conjunctiva were normal [Oropharynx] : the oropharynx was normal [Neck Appearance] : the appearance of the neck was normal [Jugular Venous Distention Increased] : there was no jugular-venous distention [] : no respiratory distress [Respiration, Rhythm And Depth] : normal respiratory rhythm and effort [Exaggerated Use Of Accessory Muscles For Inspiration] : no accessory muscle use [Auscultation Breath Sounds / Voice Sounds] : lungs were clear to auscultation bilaterally [Heart Rate And Rhythm] : heart rate was normal and rhythm regular [Heart Sounds] : normal S1 and S2 [Edema] : there was no peripheral edema [Obese] : obese [Normal] : normal [Soft, Nontender] : the abdomen was soft and nontender [No Mass] : no masses were palpated [None] : no CVA tenderness [Cervical Lymph Nodes Enlarged Posterior Bilaterally] : posterior cervical [Cervical Lymph Nodes Enlarged Anterior Bilaterally] : anterior cervical [Supraclavicular Lymph Nodes Enlarged Bilaterally] : supraclavicular [Axillary Lymph Nodes Enlarged Bilaterally] : axillary [Inguinal Lymph Nodes Enlarged Bilaterally] : inguinal [No CVA Tenderness] : no ~M costovertebral angle tenderness [No Spinal Tenderness] : no spinal tenderness [Abnormal Walk] : normal gait [Skin Color & Pigmentation] : normal skin color and pigmentation [Oriented To Time, Place, And Person] : oriented to person, place, and time [Impaired Insight] : insight and judgment were intact [Affect] : the affect was normal [Mood] : the mood was normal [Scleral Icterus] : No Scleral Icterus [Spider Angioma] : No spider angioma(s) were observed [Abdominal  Ascites] : no ascites [Asterixis] : no asterixis observed [Jaundice] : No jaundice [Palmar Erythema] : no Palmar Erythema [Depression] : no depression [Dilated Collat. Veins] : no collateral vein dilation [Firm] : not firm [Rigid] : not rigid [Rebound] : no rebound [Guarding] : no guarding [Huizar's] : a negative Huizar's sign [Liver Tender To Palpation] : not tender [FreeTextEntry1] : Grossly intact

## 2022-06-03 NOTE — ED PROVIDER NOTE - TOBACCO USE
PM&R PROGRESS NOTE:  Francisco J Whiting 53 y o  male MRN: [id number]  Unit/Bed#: -01 Encounter: [id number]        Rehabilitation Diagnosis: Impairment of mobility, safety and Activities of Daily Living (ADLs) due to Debility:  16  Debility (Non-cardiac/Non-pulmonary)    HPI: Justice Hayward a 53 y  o  male with a PMH significant for CKD, HTN, HLD, obstructive pyelonephritis with coli bacteremia, who presented to the [address] [place] [address] ED on 05/10  He had a recent discharge on 4/30/22 s/p cystoscopy and stent placement on 4/22/22   After his discharge, patient experienced intermittent waves of left flank pain described as sharp, non-radiating, lightheadedness, nausea  He also admitted to worsening shortness of breath  He arrived to ED with tachypnea and tachycardia  CT scan obtained revealing hydronephrosis and obstructive pyelonephritis  Urology was consulted and recommended acute transfer to [facility]  Patient diagnosed with sepsis and initiated on IV antibiotics  IR was consulted for concerns with potential obstruction of stent placement and noted to be in ADRIANNE  Patient went to the OR with IR for obstructive pyelonephritis and is s/p IR Nephrostomy tube placement on 5/10  He was intubated for the procedure and extubated on 5/11  Ortho was consulted on 5/13 for left knee pain and a left knee aspiration was performed  Patient is to continue WBAT to LLE  The aspiration was unsuccessful with ortho recommending adequate pain control and potential steroid injection in the future  Sepsis resolved and patient was transferred from ICU to med surg  On 5/16 patient was transitioned from IV antibiotics to oral to complete a total of 14 days  Patient participated with PT/OT and deemed appropriate for post acute rehabilitation services  He was accepted to [facility] and once hemodynamically stable, arrived on 5/19/22       SUBJECTIVE: Patient seen face to face    Patient c/o myalgias that are improving with relaxing  ASSESSMENT: Stable, progressing      PLAN:    Rehabilitation   Functional deficits:  Impaired mobility, self care, weakness to LLE due to pain in left knee and foot, pain to right shoulder, nephrostomy tube and inability to flush    Continue current rehabilitation plan of care to maximize function   Functional update:   o PT:  Sit-lying: independent  Sit-stand: independent  Bed-chair transfer:  supervision  Ambulation:supervision   Stairs: CGA  o OT:  Oral Hygiene: s/u c/u  Grooming: seated EOB  LB dressing: CGA  Picking up object: CGA  toileting hygiene: [facility]  Toilet transfer: supervision         Estimated Discharge: 6/17      Pain   Tylenol 975 mg Q8H [address]   Voltaren gel 4x daily   Lidocaine patch Q12H to right shoulder    Tramadol 60 mg Q6H [address]    DVT prophylaxis   Heparin 7500 units Q8H    Bladder plan   Continent    Bowel plan   Continent      COVID  Assessment & Plan  · Incidentally found positive on 5/31 when screening for transfer to SNF  · May come out of isolation on 6/9    Anemia  Assessment & Plan  · Iron panel obtained with elevated ferritin stores   · Monitor H/H    Recurrent left knee pain  Assessment & Plan  · Ongoing for several weeks causing difficulty with bearing weight due to pain  · XR knee without acute osseous abnormality, did reveal tricompartmental osteoarthritis similar to imaging done the year prior  · Ortho consulted and attempted an unsuccessful fluid aspiration   · Continue with adequate pain management with OP Ortho f/u for potential steroid injection once acute sepsis/infection resolves   · Diclofenac gel to the knee 4 times daily  · Left foot xray: There is no acute fracture or dislocation  Osteoarthritis 1st MTP joint  Small plantar and large retrocalcaneal spurs  No lytic or blastic osseous lesion  Soft tissues are unremarkable      Essential hypertension  Assessment & Plan  · Low-sodium diet  · Continue Norvasc 10 mg and Metoprolol 25 mg daily      Chronic kidney disease, stage 3 [facility])  Assessment & Plan  Lab Results   Component Value Date    EGFR 50 06/01/2022    EGFR 43 05/29/2022    EGFR 43 05/25/2022    CREATININE 1 53 (H) 06/01/2022    CREATININE 1 73 (H) 05/29/2022    CREATININE 1 74 (H) 05/25/2022   · Baseline creatinine approximately 1 4-1 6  · Monitor renal function and urine output  · Avoid nephrotoxins and hypotension       Severe sepsis on admission  Assessment & Plan  · Presented to [address] on 5/10 with worsening left flank pain, tachypnea and tachycardia in setting of E  Coli pyelonephritis/bacteremia   · CT revealed hydronephrosis and obstructive pyelonephritis  · Patient transferred to [facility] for higher level of care  · He went to the OR on 5/10 with IR for a nephrostomy tube placement  · Elevated white count, thought to be from active COVID infection, repeat CBC for monday      Bacteremia due to Escherichia coli  Assessment & Plan  · Positive growth of E  Coli in one blood culture on 5/10 thought to be due to UTI/pyelonephritis   · antibiotics completed      Morbid obesity (HCC)  Assessment & Plan  · BMI of 51 73  · Lifestyle and diet modifications       * Obstructive pyelonephritis  Assessment & Plan  · CT on 5/10: "Mild left hydronephrosis status post ureteral stent placement, with 3 mm calculus in the mid ureter adjacent to the stent  Multiple bilateral renal calculi "  · Recent admission and underwent L-side ureteral stent on 4/21- this was found to be obstructive  S/p left PCN placement by IR on 5/10   · Pain control  · Continue Flomax, OP follow up with urology for stone treatment on 5/25  · Continue Augmentin Q12H [facility] for total of 14 days (Completed on 5/25/22)  · Planned procedure for as OP 6/17; will need updated CT stone series prior to surgery        Appreciate IM consultants medical co-management  Labs, medications, and imaging personally reviewed        ROS:  A ten point review of systems was completed and pertinent positives are listed in subjective section  All other systems reviewed were negative  Review of Systems   Constitutional: Negative for appetite change and fatigue  HENT: Negative  Negative for congestion, sore throat and trouble swallowing  Eyes: Negative  Negative for visual disturbance  Respiratory: Negative  Negative for cough, shortness of breath and wheezing  Cardiovascular: Negative  Negative for chest pain  Gastrointestinal: Negative  Negative for abdominal distention, constipation and diarrhea  Genitourinary: Negative  Negative for difficulty urinating  Musculoskeletal: Positive for myalgias  Skin: Negative  Neurological: Negative  Hematological: Negative  Psychiatric/Behavioral: Negative  OBJECTIVE:   /72 (BP Location: Right arm)   Pulse (!) 106   Temp (!) 97 3 °F (36 3 °C) (Tympanic)   Resp 18   Ht 5' 6" (1 676 m)   Wt (!) 145 kg (320 lb 8 oz)   SpO2 96%   BMI 51 73 kg/m²     Physical Exam  Vitals reviewed  Constitutional:       General: He is not in acute distress  Appearance: Normal appearance  He is obese  He is not ill-appearing  HENT:      Head: Normocephalic and atraumatic  Right Ear: External ear normal       Left Ear: External ear normal       Nose: Nose normal    Eyes:      Pupils: Pupils are equal, round, and reactive to light  Cardiovascular:      Rate and Rhythm: Normal rate and regular rhythm  Pulses: Normal pulses  Heart sounds: Normal heart sounds  No murmur heard  Pulmonary:      Effort: Pulmonary effort is normal  No respiratory distress  Breath sounds: Rhonchi present  No wheezing  Comments: Right lower lobe  Abdominal:      General: Bowel sounds are normal  There is no distension  Palpations: Abdomen is soft  Tenderness: There is no abdominal tenderness  Musculoskeletal:         General: Normal range of motion  Cervical back: Normal range of motion        Right lower leg: Edema present  Left lower leg: Edema present  Skin:     General: Skin is warm  Neurological:      General: No focal deficit present  Mental Status: He is alert and oriented to person, place, and time     Psychiatric:         Mood and Affect: Mood normal           Lab Results   Component Value Date    WBC 12 01 (H) 06/01/2022    HGB 9 5 (L) 06/01/2022    HCT 31 9 (L) 06/01/2022    MCV 97 06/01/2022     (H) 06/01/2022     Lab Results   Component Value Date    SODIUM 133 (L) 06/01/2022    K 3 8 06/01/2022    CL 98 06/01/2022    CO2 26 06/01/2022    BUN 28 (H) 06/01/2022    CREATININE 1 53 (H) 06/01/2022    GLUC 94 06/01/2022    CALCIUM 9 7 06/01/2022     Lab Results   Component Value Date    INR 1 27 (H) 05/10/2022    INR 1 09 05/10/2022    INR 1 33 (H) 08/24/2019    PROTIME 15 4 (H) 05/10/2022    PROTIME 13 5 05/10/2022    PROTIME 16 6 (H) 08/24/2019           Current Facility-Administered Medications:     acetaminophen (TYLENOL) tablet 975 mg, 975 mg, Oral, Q8H [facility], Judi Michaels PA-C, 975 mg at 06/03/22 0514    allopurinol (ZYLOPRIM) tablet 100 mg, 100 mg, Oral, Daily, Brianne Corley PA-C, 100 mg at 06/03/22 0825    amLODIPine (NORVASC) tablet 10 mg, 10 mg, Oral, Daily, Brianne Corley PA-C, 10 mg at 06/03/22 0825    calcium carbonate (TUMS) chewable tablet 1,000 mg, 1,000 mg, Oral, BID PRN, Brianne Corley PA-C    cholecalciferol (VITAMIN D3) tablet 400 Units, 400 Units, Oral, Daily, Brianne Corley PA-C, 400 Units at 06/03/22 0825    Diclofenac Sodium (VOLTAREN) 1 % topical gel 2 g, 2 g, Topical, 4x Daily, Judi Michaels PA-C, 2 g at 06/03/22 1152    heparin (porcine) subcutaneous injection 7,500 Units, 7,500 Units, Subcutaneous, Q8H [facility], Judi Michaels PA-C, 7,500 Units at 06/03/22 0515    lidocaine (LIDODERM) 5 % patch 1 patch, 1 patch, Topical, Daily, Brianne Corley PA-C, 1 patch at 06/03/22 0825    metoprolol succinate (TOPROL-XL) 24 hr tablet 25 mg, 25 mg, Oral, Daily, RITA Roca, 25 mg at 06/03/22 0825    ondansetron (ZOFRAN-ODT) dispersible tablet 4 mg, 4 mg, Oral, Q6H PRN, Ana Cristina Walls PA-C    senna (SENOKOT) tablet 17 2 mg, 2 tablet, Oral, HS PRN, Ana Cristina Walls PA-C    tamsulosin (FLOMAX) capsule 0 4 mg, 0 4 mg, Oral, Daily With Dinner, Genoveva Michaels PA-C, 0 4 mg at 06/02/22 1717    traMADol (ULTRAM) tablet 50 mg, 50 mg, Oral, Q6H [facility], Iris Weiner MD, 50 mg at 06/03/22 1152    Past Medical History:   Diagnosis Date    Arthritis     Chronic kidney disease     Gout     Hyperlipidemia     Hypertension     Kidney stone     Obstructive pyelonephritis     Sepsis [facility])        Patient Active Problem List    Diagnosis Date Noted    COVID 06/01/2022    Anemia 05/13/2022    Obstructive pyelonephritis 04/23/2022    Acute unilateral obstructive uropathy 04/21/2022    Mixed hyperlipidemia 06/02/2021    Morbid obesity 06/02/2021    Recurrent left knee pain 06/02/2021    Insomnia 09/16/2019    Essential hypertension 08/27/2019    Severe sepsis on admission 08/23/2019    Chronic kidney disease, stage 3 ([facility] [address] ) 08/23/2019    Acute kidney injury superimposed on CKD stage 3 08/06/2019    Bacteremia due to Escherichia coli 08/04/2019    Lactic acidosis 08/04/2019    History of gout 07/15/2019    Vitamin D deficiency 06/10/2015    Morbid obesity ([facility] [address] ) 03/23/2015          Ana Cristina Walls PA-C    Total time spent:  30 minutes with more than 50% spent counseling/coordinating care  Counseling includes discussion with patient re: progress and discussion with patient of his/her current medical/functional state/information  Coordination of patient's care was performed in conjunction with consulting services  Time invested included review of patient's labs, vitals, and management of their comorbidities with continued monitoring   The care of the patient was extensively discussed and appropriate treatment plan was formulated unique for this patient  ** Please Note:  voice to text software may have been used in the creation of this document   Although proof errors in transcription or interpretation are a potential of such software** Never smoker

## 2022-06-08 ENCOUNTER — NON-APPOINTMENT (OUTPATIENT)
Age: 42
End: 2022-06-08

## 2022-06-08 RX ORDER — METFORMIN HYDROCHLORIDE 1000 MG/1
1000 TABLET, COATED ORAL
Qty: 180 | Refills: 1 | Status: DISCONTINUED | COMMUNITY
Start: 2021-12-30 | End: 2022-06-08

## 2022-06-22 ENCOUNTER — APPOINTMENT (OUTPATIENT)
Dept: INTERNAL MEDICINE | Facility: CLINIC | Age: 42
End: 2022-06-22
Payer: MEDICAID

## 2022-06-22 VITALS
RESPIRATION RATE: 16 BRPM | WEIGHT: 198 LBS | DIASTOLIC BLOOD PRESSURE: 73 MMHG | OXYGEN SATURATION: 98 % | BODY MASS INDEX: 31.08 KG/M2 | HEART RATE: 80 BPM | TEMPERATURE: 98.1 F | HEIGHT: 67 IN | SYSTOLIC BLOOD PRESSURE: 114 MMHG

## 2022-06-22 DIAGNOSIS — E04.9 NONTOXIC GOITER, UNSPECIFIED: ICD-10-CM

## 2022-06-22 DIAGNOSIS — E78.5 HYPERLIPIDEMIA, UNSPECIFIED: ICD-10-CM

## 2022-06-22 DIAGNOSIS — Z78.9 OTHER SPECIFIED HEALTH STATUS: ICD-10-CM

## 2022-06-22 DIAGNOSIS — Z00.00 ENCOUNTER FOR GENERAL ADULT MEDICAL EXAMINATION W/OUT ABNORMAL FINDINGS: ICD-10-CM

## 2022-06-22 DIAGNOSIS — R94.6 ABNORMAL RESULTS OF THYROID FUNCTION STUDIES: ICD-10-CM

## 2022-06-22 DIAGNOSIS — E55.9 VITAMIN D DEFICIENCY, UNSPECIFIED: ICD-10-CM

## 2022-06-22 PROCEDURE — 99396 PREV VISIT EST AGE 40-64: CPT | Mod: 25

## 2022-06-22 PROCEDURE — 90746 HEPB VACCINE 3 DOSE ADULT IM: CPT

## 2022-06-22 PROCEDURE — G0010: CPT

## 2022-06-22 RX ORDER — BLOOD SUGAR DIAGNOSTIC
STRIP MISCELLANEOUS
Qty: 270 | Refills: 1 | Status: ACTIVE | COMMUNITY
Start: 2022-06-22 | End: 1900-01-01

## 2022-06-22 RX ORDER — BLOOD-GLUCOSE METER
EACH MISCELLANEOUS
Qty: 1 | Refills: 2 | Status: ACTIVE | COMMUNITY
Start: 2022-06-22 | End: 1900-01-01

## 2022-06-22 RX ORDER — ALCOHOL ANTISEPTIC PADS
PADS, MEDICATED (EA) TOPICAL
Qty: 180 | Refills: 3 | Status: ACTIVE | COMMUNITY
Start: 2022-06-22 | End: 1900-01-01

## 2022-06-22 RX ORDER — ISOPROPYL ALCOHOL 70 ML/100ML
SWAB TOPICAL
Qty: 1 | Refills: 1 | Status: ACTIVE | COMMUNITY
Start: 2022-06-22 | End: 1900-01-01

## 2022-06-29 ENCOUNTER — APPOINTMENT (OUTPATIENT)
Dept: HEPATOLOGY | Facility: CLINIC | Age: 42
End: 2022-06-29

## 2022-06-29 VITALS
BODY MASS INDEX: 30.92 KG/M2 | DIASTOLIC BLOOD PRESSURE: 79 MMHG | WEIGHT: 197 LBS | OXYGEN SATURATION: 98 % | RESPIRATION RATE: 16 BRPM | TEMPERATURE: 98.3 F | HEART RATE: 83 BPM | HEIGHT: 67 IN | SYSTOLIC BLOOD PRESSURE: 123 MMHG

## 2022-06-29 PROCEDURE — 99214 OFFICE O/P EST MOD 30 MIN: CPT

## 2022-06-29 NOTE — ASSESSMENT
[FreeTextEntry1] : \par advised covid19 shot \par \par dmii and hld and Thyroid meds refilled\par REFER TO OPHTHAL AND PODIATRY \par \par  pt need to see podiatry and dietitian and ophthal and will discussed\par \par \par \par ABD PAIN WITH GERD AND EARLY SATIETY AND DYSPEPSIA;\par EXAM SHOWED NO RED FLAG SYMPTOMS\par s/p LAB AND STOOL \par s/p treatment fro h.pylori\par US ABD done \par saw GI and s/p egd;\par \par SEEING HEPATOLOGIST; PLANNING FOR FIBROSCAN \par \par \par \par plantar fasciitis of Right foot \par ice\par topical NSAIDs \par \par \par -non immune to Hep B: shot 1 tolerate\par  1-2 motnhs for hepb #2 \par \par 3 months follow up \par \par \par

## 2022-06-29 NOTE — HISTORY OF PRESENT ILLNESS
[de-identified] : 42 y.o [place] male with PMHx of vitiligo and recently diagnosed hypothyroidism and proteinuria AND Obesity ,DMII, comes in for annual\par \par \par pt was in ED in 08/31/2020 in [facility] due to leg swelling; \par pt had leg swelling and fatigue and constipation and feeling sleepy for last 6 months; and worsening in last 3 months; \par pt was in [facility] ED and had TSH at 47.10; had HIV negative at 08/27/2020; has not follow up with ENDO as referred;\par \par interval Hx 11/13/2020: \par \par leg swelling resolved s/p on levothyroxine; compliance with his home meds \par has not follow up with us THYROID OR endo \par saw derm FOR VITILIGO and Hidradenitis \par denies of any HA/CP/SOB/Palpitation\par no other compliant \par \par \par interval Hx 02/17/2021: \par \par compliance with all his home meds; \par \par interval hx 10/29/2021\par \par has not follow up since 02/2012;\par has not do US thyroid \par \par interval hx 12/20/2021\par \par has not follow up ONSITE  since 02/2012;\par has not do US thyroid \par \par \par reported that he start feeling early SATIETY and dyspepsia for 6 months ; no nausea or vomiting; no fever or chills;no melena or blood in stool; reported acid relax; taking toms; no weight loss; no fhx of GI ca or IBD; \par \par \par also reported right bottom foot pain for 2-3 months; was standing long hour at work; no injury or trauma or joints swelling \par \par \par interval hx 02/16/2022\par \par LFT still high but stable\par \par has not see GI or opthal or podiatry yet \par \par \par interval hx 03/25/2022\par HAS NOT FOLLOW UP WITH gi OR OPHTHAL OR PODIATRY YET \par \par ROS\par \par Constitutional:  no fever and no chills. \par Eyes:  no discharge and no pain. \par HEENT:  no earache and no hearing loss. \par Cardiovascular:  no chest pain, no palpitations and no leg claudication. \par Respiratory:  no shortness of breath, no wheezing and no cough. \par Gastrointestinal:  no abdominal pain, no nausea and no constipation. \par Genitourinary:  no dysuria and no incontinence. \par Musculoskeletal:  no joint pain, no joint stiffness and no joint swelling. \par Integumentary:  no itching and no mole changes. \par Neurological:  no headache, no dizziness and no fainting. \par Psychiatric:  not suicidal, no insomnia, no anxiety and no depression. \par \par Physical Exam\par \par Constitutional:   no acute distress, well nourished, well developed and well-appearing. \par Eyes:  normal sclera/conjunctiva, pupils equal round and reactive to light and extraocular movements intact. \par ENT:  the outer ears and nose were normal in appearance and the oropharynx was normal. \par Neck:  supple, no lymphadenopathy and the thyroid was normal and there were no nodules present. \par Pulmonary:  no respiratory distress, lungs were clear to auscultation bilaterally, no accessory muscle use. \par Cardiac:  normal rate, with a regular rhythm, normal S1 and S2 and no murmur heard. \par Vascular:  there was no peripheral edema. \par Abdomen:  abdomen soft, non-tender, non-distended, no abdominal mass palpated, no HSM and normal bowel sounds. \par Lymphatic:  no posterior cervical lymphadenopathy, no anterior cervical lymphadenopathy. \par Back:  no CVA tenderness and no spinal tenderness. \par Musculoskeletal: no joint swelling and grossly normal strength/tone. \par Skin:  no rash. \par Neurology:  normal gait, coordination grossly intact, no focal deficits and deep tendon reflexes were 2+ and symmetric. \par Psychiatric:  the affect was normal, oriented to person, place, and time and insight and judgment were intact.\par

## 2022-06-29 NOTE — HEALTH RISK ASSESSMENT
[No] : In the past 12 months have you used drugs other than those required for medical reasons? No [0] : 2) Feeling down, depressed, or hopeless: Not at all (0) [Hepatitis C test offered] : Hepatitis C test offered [None] : None [With Family] : lives with family [Employed] : employed [] :  [Sexually Active] : sexually active [Feels Safe at Home] : Feels safe at home [Fully functional (bathing, dressing, toileting, transferring, walking, feeding)] : Fully functional (bathing, dressing, toileting, transferring, walking, feeding) [Fully functional (using the telephone, shopping, preparing meals, housekeeping, doing laundry, using] : Fully functional and needs no help or supervision to perform IADLs (using the telephone, shopping, preparing meals, housekeeping, doing laundry, using transportation, managing medications and managing finances) [de-identified] : SMOKE FOR 20 YRS; 1 PACK A DAY; QUIT FOR 2 yrs ago  [[id number]] : 0 [Change in mental status noted] : No change in mental status noted [Language] : denies difficulty with language [Behavior] : denies difficulty with behavior [Learning/Retaining New Information] : denies difficulty learning/retaining new information [Handling Complex Tasks] : denies difficulty handling complex tasks [Reasoning] : denies difficulty with reasoning [Spatial Ability and Orientation] : denies difficulty with spatial ability and orientation [High Risk Behavior] : no high risk behavior [HIVDate] : 08/2020 [HIVComments] : NEGATIVE  [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

## 2022-06-30 NOTE — ASSESSMENT
[FreeTextEntry1] : 41 yo obese (BMI 31) Male with Hx of HLD, DM Type II (HbA1c 7.1), hypothyroidism, proteinuria, GERD and dyspepsia, s/p EGD 5/10/22 (path: minimal to mild chronic gastritis, neg HP) is here for initial evaluation for mild transaminase elevation. \par \par \par Patient is seen for abnormal liver chemistries that have been noted since 12/2021.The pattern of elevated liver enzymes is hepatocellular  and mild (~2x ULN), with normal cholestatic parameters and intact synthetic function.  Patient is without prior history of liver disease and denies family history of liver disease or cancer. Patient denies alcohol consumption, any new prescription or OTC medications, any herbal supplements, any iv. or other drug use.  BMI is 31, without significant change, but has metabolic risk factors (HLD, DM). Denies risk factors for viral hepatitis. No stigmata of advanced chronic liver disease.\par \par - Discussed differential Dx, likely NAFLD, but need to rule out other causes. \par - CLD blood work unremarkable.\par - US abd 1/2022 showed echogenic liver, otherwise was unremarkable. Low suspicion for hepatic/portal vein thrombosis.\par - Will obtain FibroScan, advised again to schedule, task sent. \par -  HAV immune. HBV not immune, advised on vaccine, started with Dr. Tejada.\par - Counseled on avoiding alcohol, smoking cessation, avoiding herbal supplements, healthy lifestyle.\par - Nutritionist referral placed previously\par \par Hep A immune\par Hep B not immune, not exposed\par Hep C neg\par HIV neg\par A1AT wNL\par Ferritin 333, iron wNL\par Ceruloplasmin 23\par ANNETTE neg, SMA 1:20, AMA neg, IgG and IgM WNL\par tTG IgA neg\par Liver enzymes improved, ALT remains ~1.5-2x ULN\par \par RTC 3 months but patient to call office after Fibroscan to discuss result\par

## 2022-06-30 NOTE — HISTORY OF PRESENT ILLNESS
[Autoimmune Disorder] : autoimmune disorder [Needlestick Exposure] : no needlestick exposure [Infected Sexual Partner] : no infected sexual partner [IV Drug Use] : no IV drug use [Tattoo] : no tattoos [Body Piercing] : no body piercing [Hemodialysis] : no hemodialysis [Transfusion before 1992] : no transfusion before 1992 [Transplant before 1992] : no transplant before 1992 [Incarceration] : no incarceration [Alcohol Abuse] : no alcohol abuse [Household Contact to HBV] : no household contact to HBV [Cocaine Use] : no cocaine use [de-identified] : Born in [facility], last international travel mid 2021; works as  [FreeTextEntry1] : 41 yo obese (BMI 31) Male with Hx of HLD, DM Type II (HbA1c 7.1), hypothyroidism, proteinuria, GERD and dyspepsia, s/p EGD 5/10/22 (path: minimal to mild chronic gastritis, neg HP) is here for initial evaluation for mild transaminase elevation. \par Denies OTC meds, herbal supplements, workout supplements. \par \par He was seen for initial visit 5/25/22 and is here for follow up. CLD workup unremarkable. He has not done Fibroscan yet. He lost 9 lb since 2/2022.

## 2022-06-30 NOTE — PHYSICAL EXAM
[General Appearance - Alert] : alert [General Appearance - In No Acute Distress] : in no acute distress [General Appearance - Well Nourished] : well nourished [General Appearance - Well Developed] : well developed [Sclera] : the sclera and conjunctiva were normal [Oropharynx] : the oropharynx was normal [Neck Appearance] : the appearance of the neck was normal [Jugular Venous Distention Increased] : there was no jugular-venous distention [] : no respiratory distress [Respiration, Rhythm And Depth] : normal respiratory rhythm and effort [Exaggerated Use Of Accessory Muscles For Inspiration] : no accessory muscle use [Auscultation Breath Sounds / Voice Sounds] : lungs were clear to auscultation bilaterally [Heart Rate And Rhythm] : heart rate was normal and rhythm regular [Heart Sounds] : normal S1 and S2 [Edema] : there was no peripheral edema [Cervical Lymph Nodes Enlarged Posterior Bilaterally] : posterior cervical [Cervical Lymph Nodes Enlarged Anterior Bilaterally] : anterior cervical [Supraclavicular Lymph Nodes Enlarged Bilaterally] : supraclavicular [Axillary Lymph Nodes Enlarged Bilaterally] : axillary [Inguinal Lymph Nodes Enlarged Bilaterally] : inguinal [No CVA Tenderness] : no ~M costovertebral angle tenderness [No Spinal Tenderness] : no spinal tenderness [Abnormal Walk] : normal gait [Skin Color & Pigmentation] : normal skin color and pigmentation [Oriented To Time, Place, And Person] : oriented to person, place, and time [Impaired Insight] : insight and judgment were intact [Affect] : the affect was normal [Mood] : the mood was normal [Obese] : obese [Normal] : normal [Soft, Nontender] : the abdomen was soft and nontender [No Mass] : no masses were palpated [None] : no CVA tenderness [Scleral Icterus] : No Scleral Icterus [Spider Angioma] : No spider angioma(s) were observed [Abdominal  Ascites] : no ascites [Asterixis] : no asterixis observed [Jaundice] : No jaundice [Palmar Erythema] : no Palmar Erythema [Depression] : no depression [FreeTextEntry1] : Grossly intact [Dilated Collat. Veins] : no collateral vein dilation [Firm] : not firm [Rigid] : not rigid [Rebound] : no rebound [Guarding] : no guarding [Huizar's] : a negative Huizar's sign [Liver Tender To Palpation] : not tender

## 2022-06-30 NOTE — REVIEW OF SYSTEMS
[Recent Weight Loss (___ Lbs)] : recent [unfilled] ~Ulb weight loss [Negative] : Heme/Lymph [Abdominal Pain] : no abdominal pain [Vomiting] : no vomiting [Constipation] : no constipation [Diarrhea] : no diarrhea [Heartburn] : no heartburn [Melena] : no melena [Dysuria] : no dysuria [Itching] : no itching [Depression] : no depression [FreeTextEntry7] : His abdominal complaints appears to be resolved since H. pylori was treated.  [FreeTextEntry9] : Occasional chronic knee pain

## 2022-07-05 ENCOUNTER — APPOINTMENT (OUTPATIENT)
Dept: HEPATOLOGY | Facility: CLINIC | Age: 42
End: 2022-07-05

## 2022-07-05 PROCEDURE — 91200 LIVER ELASTOGRAPHY: CPT

## 2022-07-06 LAB
25(OH)D3 SERPL-MCNC: 21.1 NG/ML
25(OH)D3 SERPL-MCNC: 31.4 NG/ML
ALBUMIN SERPL ELPH-MCNC: 4.6 G/DL
ALBUMIN SERPL ELPH-MCNC: 4.6 G/DL
ALP BLD-CCNC: 66 U/L
ALP BLD-CCNC: 73 U/L
ALT SERPL-CCNC: 48 U/L
ALT SERPL-CCNC: 70 U/L
ANION GAP SERPL CALC-SCNC: 13 MMOL/L
ANION GAP SERPL CALC-SCNC: 15 MMOL/L
APPEARANCE: CLEAR
AST SERPL-CCNC: 31 U/L
AST SERPL-CCNC: 46 U/L
BASOPHILS # BLD AUTO: 0.07 K/UL
BASOPHILS # BLD AUTO: 0.08 K/UL
BASOPHILS NFR BLD AUTO: 0.8 %
BASOPHILS NFR BLD AUTO: 1.1 %
BILIRUB SERPL-MCNC: 0.2 MG/DL
BILIRUB SERPL-MCNC: 0.4 MG/DL
BILIRUBIN URINE: NEGATIVE
BLOOD URINE: NEGATIVE
BUN SERPL-MCNC: 17 MG/DL
BUN SERPL-MCNC: 20 MG/DL
CALCIUM SERPL-MCNC: 9.9 MG/DL
CALCIUM SERPL-MCNC: 9.9 MG/DL
CHLORIDE SERPL-SCNC: 101 MMOL/L
CHLORIDE SERPL-SCNC: 102 MMOL/L
CHOLEST SERPL-MCNC: 109 MG/DL
CHOLEST SERPL-MCNC: 112 MG/DL
CO2 SERPL-SCNC: 22 MMOL/L
CO2 SERPL-SCNC: 23 MMOL/L
COLOR: YELLOW
CREAT SERPL-MCNC: 0.88 MG/DL
CREAT SERPL-MCNC: 0.91 MG/DL
CREAT SPEC-SCNC: 179 MG/DL
CREAT SPEC-SCNC: 223 MG/DL
EGFR: 107 ML/MIN/1.73M2
EGFR: 109 ML/MIN/1.73M2
EOSINOPHIL # BLD AUTO: 0.21 K/UL
EOSINOPHIL # BLD AUTO: 0.26 K/UL
EOSINOPHIL NFR BLD AUTO: 3 %
EOSINOPHIL NFR BLD AUTO: 3.2 %
ESTIMATED AVERAGE GLUCOSE: 137 MG/DL
ESTIMATED AVERAGE GLUCOSE: 157 MG/DL
GLUCOSE QUALITATIVE U: NEGATIVE
GLUCOSE SERPL-MCNC: 76 MG/DL
GLUCOSE SERPL-MCNC: 83 MG/DL
H PYLORI AG STL QL: NOT DETECTED
HBA1C MFR BLD HPLC: 6.4 %
HBA1C MFR BLD HPLC: 7.1 %
HBV SURFACE AB SER QL: NONREACTIVE
HBV SURFACE AG SER QL: NONREACTIVE
HCT VFR BLD CALC: 44.1 %
HCT VFR BLD CALC: 45.9 %
HCV AB SER QL: NONREACTIVE
HCV S/CO RATIO: 0.08 S/CO
HDLC SERPL-MCNC: 37 MG/DL
HDLC SERPL-MCNC: 41 MG/DL
HGB BLD-MCNC: 13.9 G/DL
HGB BLD-MCNC: 14.8 G/DL
IMM GRANULOCYTES NFR BLD AUTO: 0.2 %
IMM GRANULOCYTES NFR BLD AUTO: 0.3 %
KETONES URINE: NEGATIVE
LDLC SERPL CALC-MCNC: 47 MG/DL
LDLC SERPL CALC-MCNC: 54 MG/DL
LEUKOCYTE ESTERASE URINE: NEGATIVE
LYMPHOCYTES # BLD AUTO: 1.65 K/UL
LYMPHOCYTES # BLD AUTO: 3.02 K/UL
LYMPHOCYTES NFR BLD AUTO: 23.6 %
LYMPHOCYTES NFR BLD AUTO: 36.7 %
MAN DIFF?: NORMAL
MAN DIFF?: NORMAL
MCHC RBC-ENTMCNC: 28.2 PG
MCHC RBC-ENTMCNC: 28.7 PG
MCHC RBC-ENTMCNC: 31.5 GM/DL
MCHC RBC-ENTMCNC: 32.2 GM/DL
MCV RBC AUTO: 89 FL
MCV RBC AUTO: 89.5 FL
MICROALBUMIN 24H UR DL<=1MG/L-MCNC: 4.3 MG/DL
MICROALBUMIN 24H UR DL<=1MG/L-MCNC: 6.2 MG/DL
MICROALBUMIN/CREAT 24H UR-RTO: 24 MG/G
MICROALBUMIN/CREAT 24H UR-RTO: 28 MG/G
MONOCYTES # BLD AUTO: 0.96 K/UL
MONOCYTES # BLD AUTO: 1.13 K/UL
MONOCYTES NFR BLD AUTO: 13.7 %
MONOCYTES NFR BLD AUTO: 13.7 %
NEUTROPHILS # BLD AUTO: 3.74 K/UL
NEUTROPHILS # BLD AUTO: 4.08 K/UL
NEUTROPHILS NFR BLD AUTO: 45.4 %
NEUTROPHILS NFR BLD AUTO: 58.3 %
NITRITE URINE: NEGATIVE
NONHDLC SERPL-MCNC: 71 MG/DL
NONHDLC SERPL-MCNC: 72 MG/DL
PH URINE: 6
PLATELET # BLD AUTO: 156 K/UL
PLATELET # BLD AUTO: 194 K/UL
POTASSIUM SERPL-SCNC: 4.3 MMOL/L
POTASSIUM SERPL-SCNC: 4.3 MMOL/L
PROT SERPL-MCNC: 7.1 G/DL
PROT SERPL-MCNC: 7.1 G/DL
PROTEIN URINE: NORMAL
RBC # BLD: 4.93 M/UL
RBC # BLD: 5.16 M/UL
RBC # FLD: 13.7 %
RBC # FLD: 14.2 %
SODIUM SERPL-SCNC: 138 MMOL/L
SODIUM SERPL-SCNC: 138 MMOL/L
SPECIFIC GRAVITY URINE: 1.03
T3 SERPL-MCNC: 125 NG/DL
T3 SERPL-MCNC: 134 NG/DL
T4 FREE SERPL-MCNC: 1.5 NG/DL
T4 FREE SERPL-MCNC: 1.8 NG/DL
TRIGL SERPL-MCNC: 128 MG/DL
TRIGL SERPL-MCNC: 86 MG/DL
TSH SERPL-ACNC: 0.14 UIU/ML
TSH SERPL-ACNC: 3.29 UIU/ML
UROBILINOGEN URINE: NORMAL
WBC # FLD AUTO: 7 K/UL
WBC # FLD AUTO: 8.24 K/UL

## 2022-07-06 RX ORDER — LEVOTHYROXINE SODIUM 0.14 MG/1
137 TABLET ORAL DAILY
Qty: 90 | Refills: 1 | Status: DISCONTINUED | COMMUNITY
Start: 2020-10-11 | End: 2022-07-06

## 2022-07-22 ENCOUNTER — NON-APPOINTMENT (OUTPATIENT)
Age: 42
End: 2022-07-22

## 2022-07-29 ENCOUNTER — APPOINTMENT (OUTPATIENT)
Dept: INTERNAL MEDICINE | Facility: CLINIC | Age: 42
End: 2022-07-29

## 2022-07-29 PROCEDURE — G0010: CPT

## 2022-07-29 PROCEDURE — 90746 HEPB VACCINE 3 DOSE ADULT IM: CPT

## 2022-08-01 ENCOUNTER — APPOINTMENT (OUTPATIENT)
Dept: GASTROENTEROLOGY | Facility: CLINIC | Age: 42
End: 2022-08-01

## 2022-09-09 ENCOUNTER — APPOINTMENT (OUTPATIENT)
Dept: INTERNAL MEDICINE | Facility: CLINIC | Age: 42
End: 2022-09-09

## 2022-10-19 ENCOUNTER — APPOINTMENT (OUTPATIENT)
Dept: HEPATOLOGY | Facility: CLINIC | Age: 42
End: 2022-10-19

## 2022-10-19 VITALS
HEART RATE: 78 BPM | DIASTOLIC BLOOD PRESSURE: 79 MMHG | OXYGEN SATURATION: 98 % | TEMPERATURE: 98 F | HEIGHT: 67 IN | SYSTOLIC BLOOD PRESSURE: 139 MMHG | BODY MASS INDEX: 31.71 KG/M2 | RESPIRATION RATE: 16 BRPM | WEIGHT: 202 LBS

## 2022-10-19 DIAGNOSIS — R79.89 OTHER SPECIFIED ABNORMAL FINDINGS OF BLOOD CHEMISTRY: ICD-10-CM

## 2022-10-19 PROCEDURE — 99214 OFFICE O/P EST MOD 30 MIN: CPT

## 2022-10-19 RX ORDER — PANTOPRAZOLE 40 MG/1
40 TABLET, DELAYED RELEASE ORAL
Qty: 30 | Refills: 0 | Status: DISCONTINUED | COMMUNITY
Start: 2021-12-20 | End: 2022-10-19

## 2022-10-24 PROBLEM — R79.89 ELEVATED LFTS: Status: ACTIVE | Noted: 2021-12-30

## 2022-10-24 NOTE — HISTORY OF PRESENT ILLNESS
[Autoimmune Disorder] : autoimmune disorder [Needlestick Exposure] : no needlestick exposure [Infected Sexual Partner] : no infected sexual partner [IV Drug Use] : no IV drug use [Tattoo] : no tattoos [Body Piercing] : no body piercing [Hemodialysis] : no hemodialysis [Transfusion before 1992] : no transfusion before 1992 [Transplant before 1992] : no transplant before 1992 [Incarceration] : no incarceration [Alcohol Abuse] : no alcohol abuse [Household Contact to HBV] : no household contact to HBV [Cocaine Use] : no cocaine use [de-identified] : Born in [facility], last international travel mid 2021; works as  [FreeTextEntry1] : 41 yo obese (BMI 31) Male with Hx of HLD, DM Type II (HbA1c 7.1), hypothyroidism, proteinuria, GERD and dyspepsia, s/p EGD 5/10/22 (path: minimal to mild chronic gastritis, neg HP) is here for initial evaluation for mild transaminase elevation. \par Denied OTC meds, herbal supplements, workout supplements. \par \par He was seen for initial visit 5/25/22. \par Fibroscan 7/5/22 showed S3 steatosis and F2 fibrosis (, LSM 9.1).\par Liver workup was unremarkable beyond weak pos SMA 1:20 and metabolic risk factors. \par Hep A immune; Hep B not immune, not exposed; Hep C neg; HIV neg\par A1AT wNL; Ferritin 333, iron wNL; Ceruloplasmin 23\par ANNETTE neg, SMA 1:20, AMA neg, IgG and IgM WNL\par tTG IgA neg\par Liver enzymes improved, ALT remains ~1.5-2x ULN\par \par He lost 9 lb from beginning to mid 2022, but now gaining again and reports that has more sedentary lifestyle again.

## 2022-10-24 NOTE — ASSESSMENT
[FreeTextEntry1] : 43 yo obese (BMI 31) Male with Hx of HLD, DM Type II (HbA1c 7.1), hypothyroidism, proteinuria, GERD and dyspepsia, s/p EGD 5/10/22 (path: minimal to mild chronic gastritis, neg HP) is here for initial evaluation for mild transaminase elevation. \par \par \par Patient is seen for abnormal liver chemistries that have been noted since 12/2021.The pattern of elevated liver enzymes is hepatocellular  and mild (~2x ULN), with normal cholestatic parameters and intact synthetic function.  Patient is without prior history of liver disease and denies family history of liver disease or cancer. Patient denies alcohol consumption, any new prescription or OTC medications, any herbal supplements, any iv. or other drug use.  BMI is 31, without significant change, but has metabolic risk factors (HLD, DM). Denies risk factors for viral hepatitis. No stigmata of advanced chronic liver disease.\par \par - Discussed differential Dx, likely NAFLD, but need to rule out other causes. \par - CLD blood work unremarkable.\par - US abd 1/2022 showed echogenic liver, otherwise was unremarkable. Low suspicion for hepatic/portal vein thrombosis.\par - Fibroscan suggested S3 steatosis, F2 fibrosis. \par -  HAV immune. HBV not immune, advised on vaccine, started with Dr. Tejada.\par - Counseled on avoiding alcohol, smoking cessation, avoiding herbal supplements, healthy lifestyle.\par - Nutritionist referral placed previously\par - Discussed liver biopsy, semaglutide, clinical trials, but patient motivated to try to loose weight on his own. Discussed that fibrosis can progress. Will try to get MR elastography for better assessment of fibrosis, steatosis, defers biopsy.\par \par \par RTC 3 months but patient to call office after to discuss results\par

## 2022-10-24 NOTE — REVIEW OF SYSTEMS
[Negative] : Heme/Lymph [Recent Weight Gain (___ Lbs)] : recent [unfilled] ~Ulb weight gain [Abdominal Pain] : no abdominal pain [Vomiting] : no vomiting [Constipation] : no constipation [Diarrhea] : no diarrhea [Heartburn] : no heartburn [Melena] : no melena [Dysuria] : no dysuria [Itching] : no itching [Depression] : no depression [FreeTextEntry7] : His abdominal complaints appears to be resolved since H. pylori was treated.  [FreeTextEntry9] : Occasional chronic knee pain

## 2022-10-24 NOTE — PHYSICAL EXAM
[General Appearance - Alert] : alert [General Appearance - In No Acute Distress] : in no acute distress [General Appearance - Well Developed] : well developed [General Appearance - Well Nourished] : well nourished [Sclera] : the sclera and conjunctiva were normal [Oropharynx] : the oropharynx was normal [Neck Appearance] : the appearance of the neck was normal [Jugular Venous Distention Increased] : there was no jugular-venous distention [] : no respiratory distress [Respiration, Rhythm And Depth] : normal respiratory rhythm and effort [Exaggerated Use Of Accessory Muscles For Inspiration] : no accessory muscle use [Auscultation Breath Sounds / Voice Sounds] : lungs were clear to auscultation bilaterally [Heart Rate And Rhythm] : heart rate was normal and rhythm regular [Heart Sounds] : normal S1 and S2 [Edema] : there was no peripheral edema [Cervical Lymph Nodes Enlarged Posterior Bilaterally] : posterior cervical [Cervical Lymph Nodes Enlarged Anterior Bilaterally] : anterior cervical [Supraclavicular Lymph Nodes Enlarged Bilaterally] : supraclavicular [Axillary Lymph Nodes Enlarged Bilaterally] : axillary [Inguinal Lymph Nodes Enlarged Bilaterally] : inguinal [No CVA Tenderness] : no ~M costovertebral angle tenderness [No Spinal Tenderness] : no spinal tenderness [Abnormal Walk] : normal gait [Skin Color & Pigmentation] : normal skin color and pigmentation [Oriented To Time, Place, And Person] : oriented to person, place, and time [Impaired Insight] : insight and judgment were intact [Affect] : the affect was normal [Mood] : the mood was normal [Obese] : obese [Normal] : normal [Soft, Nontender] : the abdomen was soft and nontender [No Mass] : no masses were palpated [None] : no CVA tenderness [Scleral Icterus] : No Scleral Icterus [Spider Angioma] : No spider angioma(s) were observed [Abdominal  Ascites] : no ascites [Asterixis] : no asterixis observed [Jaundice] : No jaundice [Palmar Erythema] : no Palmar Erythema [Depression] : no depression [FreeTextEntry1] : Grossly intact [Dilated Collat. Veins] : no collateral vein dilation [Firm] : not firm [Rigid] : not rigid [Rebound] : no rebound [Guarding] : no guarding [Huizar's] : a negative Huizar's sign [Liver Tender To Palpation] : not tender

## 2022-10-28 LAB
ALBUMIN SERPL ELPH-MCNC: 5 G/DL
ALP BLD-CCNC: 69 U/L
ALT SERPL-CCNC: 60 U/L
ANION GAP SERPL CALC-SCNC: 13 MMOL/L
AST SERPL W P-5'-P-CCNC: 34 IU/L
AST SERPL-CCNC: 36 U/L
BASOPHILS # BLD AUTO: 0.09 K/UL
BASOPHILS NFR BLD AUTO: 1 %
BILIRUB DIRECT SERPL-MCNC: 0.1 MG/DL
BILIRUB INDIRECT SERPL-MCNC: 0.2 MG/DL
BILIRUB SERPL-MCNC: 0.2 MG/DL
BUN SERPL-MCNC: 14 MG/DL
CALCIUM SERPL-MCNC: 10.2 MG/DL
CHLORIDE SERPL-SCNC: 103 MMOL/L
CHOLEST SERPL-MCNC: 137 MG/DL
CO2 SERPL-SCNC: 25 MMOL/L
COMMENT:: NORMAL
CREAT SERPL-MCNC: 0.83 MG/DL
EGFR: 111 ML/MIN/1.73M2
EOSINOPHIL # BLD AUTO: 0.3 K/UL
EOSINOPHIL NFR BLD AUTO: 3.3 %
ESTIMATED AVERAGE GLUCOSE: 148 MG/DL
FIBROSIS STAGE SERPL QL: NORMAL
FIBROSURE ALPHA 2 MACROGLOBULINS: 214 MG/DL
FIBROSURE ALT (SGPT): 64 IU/L
FIBROSURE APOLIPOPROTEIN A1: 131 MG/DL
FIBROSURE GGT: 30 IU/L
FIBROSURE HAPTOGLOBIN: 209 MG/DL
FIBROSURE SCORING: NORMAL
FIBROSURE TOTAL BILIRUBIN: 0.2 MG/DL
GLUCOSE SERPL-MCNC: 101 MG/DL
GLUCOSE SERPL-MCNC: 98 MG/DL
H PYLORI AG STL QL: NEGATIVE
HBA1C MFR BLD HPLC: 6.8 %
HCT VFR BLD CALC: 48.5 %
HGB BLD-MCNC: 15.6 G/DL
IMM GRANULOCYTES NFR BLD AUTO: 0.3 %
INR PPP: 0.99 RATIO
INTERPRETATIONS:: NORMAL
LIVER FIBR SCORE SERPL CALC.FIBROSURE: 0.11
LYMPHOCYTES # BLD AUTO: 3.5 K/UL
LYMPHOCYTES NFR BLD AUTO: 38.5 %
MAN DIFF?: NORMAL
MCHC RBC-ENTMCNC: 28.7 PG
MCHC RBC-ENTMCNC: 32.2 GM/DL
MCV RBC AUTO: 89.3 FL
MONOCYTES # BLD AUTO: 0.84 K/UL
MONOCYTES NFR BLD AUTO: 9.3 %
NASH SCORING: NORMAL
NECROINFLAMMATORY ACT GRADE SERPL QL: NORMAL
NECROINFLAMMATORY ACT SCORE SERPL: 0.5
NEUTROPHILS # BLD AUTO: 4.32 K/UL
NEUTROPHILS NFR BLD AUTO: 47.6 %
PLATELET # BLD AUTO: 188 K/UL
POTASSIUM SERPL-SCNC: 4.3 MMOL/L
PROT SERPL-MCNC: 7.5 G/DL
PT BLD: 11.6 SEC
RBC # BLD: 5.43 M/UL
RBC # FLD: 13.9 %
SERVICE CMNT-IMP: NORMAL
SODIUM SERPL-SCNC: 141 MMOL/L
STEATOSIS GRADE: NORMAL
STEATOSIS GRADING: NORMAL
STEATOSIS SCORE: 0.62
TRIGL SERPL-MCNC: 191 MG/DL
TSH SERPL-ACNC: 3.9 UIU/ML
WBC # FLD AUTO: 9.08 K/UL

## 2022-11-04 ENCOUNTER — APPOINTMENT (OUTPATIENT)
Dept: INTERNAL MEDICINE | Facility: CLINIC | Age: 42
End: 2022-11-04

## 2022-11-04 VITALS
HEIGHT: 67 IN | HEART RATE: 74 BPM | SYSTOLIC BLOOD PRESSURE: 131 MMHG | BODY MASS INDEX: 31.39 KG/M2 | DIASTOLIC BLOOD PRESSURE: 78 MMHG | TEMPERATURE: 98.1 F | OXYGEN SATURATION: 98 % | WEIGHT: 200 LBS | RESPIRATION RATE: 16 BRPM

## 2022-11-04 PROCEDURE — 99215 OFFICE O/P EST HI 40 MIN: CPT | Mod: 25

## 2022-11-04 PROCEDURE — G0008: CPT

## 2022-11-04 PROCEDURE — 90686 IIV4 VACC NO PRSV 0.5 ML IM: CPT

## 2022-11-04 RX ORDER — SIMETHICONE 180 MG
180 CAPSULE ORAL 4 TIMES DAILY
Qty: 120 | Refills: 3 | Status: DISCONTINUED | COMMUNITY
Start: 2022-03-30 | End: 2022-11-04

## 2022-11-05 NOTE — HEALTH RISK ASSESSMENT
[Never] : Never [No] : No [0] : 2) Feeling down, depressed, or hopeless: Not at all (0) [[id number]] : 0 [Patient reported colonoscopy was normal] : Patient reported colonoscopy was normal [ColonoscopyDate] : 05/2022

## 2022-11-05 NOTE — HISTORY OF PRESENT ILLNESS
[FreeTextEntry1] : establish care [de-identified] : 42yoM yaa PMH DM, NAFLD, hypothyroidism, vitiligo presents to establish care\par \par follows with hepatology - fibrosis noted on fibroscan - MRE pending\par \par declined treatment for vitiligo\par \par reveiwed recent labs - \par a1c 6.8%, LDL 47, TSH within range, ALT 60\par \par denies chest pain, sob

## 2022-12-06 ENCOUNTER — APPOINTMENT (OUTPATIENT)
Dept: MRI IMAGING | Facility: IMAGING CENTER | Age: 42
End: 2022-12-06

## 2023-02-16 NOTE — HISTORY OF PRESENT ILLNESS
[de-identified] : 42yoM yaa PMH DM, NAFLD, hypothyroidism, vitiligo presents to establish care\par \par follows with hepatology - fibrosis noted on fibroscan - MRE pending\par \par declined treatment for vitiligo\par \par reveiwed recent labs - \par a1c 6.8%, LDL 47, TSH within range, ALT 60\par \par denies chest pain, sob

## 2023-02-17 ENCOUNTER — APPOINTMENT (OUTPATIENT)
Dept: INTERNAL MEDICINE | Facility: CLINIC | Age: 43
End: 2023-02-17

## 2023-07-24 ENCOUNTER — RESULT REVIEW (OUTPATIENT)
Age: 43
End: 2023-07-24

## 2023-07-24 ENCOUNTER — APPOINTMENT (OUTPATIENT)
Dept: INTERNAL MEDICINE | Facility: CLINIC | Age: 43
End: 2023-07-24
Payer: MEDICAID

## 2023-07-24 VITALS
RESPIRATION RATE: 16 BRPM | HEART RATE: 75 BPM | TEMPERATURE: 98.1 F | HEIGHT: 67 IN | WEIGHT: 200 LBS | DIASTOLIC BLOOD PRESSURE: 82 MMHG | BODY MASS INDEX: 31.39 KG/M2 | SYSTOLIC BLOOD PRESSURE: 122 MMHG | OXYGEN SATURATION: 98 %

## 2023-07-24 DIAGNOSIS — M25.562 PAIN IN LEFT KNEE: ICD-10-CM

## 2023-07-24 PROCEDURE — 99214 OFFICE O/P EST MOD 30 MIN: CPT

## 2023-07-26 PROBLEM — M25.562 LEFT KNEE PAIN: Status: ACTIVE | Noted: 2023-07-24

## 2023-07-26 NOTE — HISTORY OF PRESENT ILLNESS
[FreeTextEntry1] : routine follow up [de-identified] : 43yoM yaa PMH DM, NAFLD, hypothyroidism, vitiligo presents for follow up\par \par endorses left knee pain\par seen at [facility] for left hip pain

## 2023-07-28 LAB
25(OH)D3 SERPL-MCNC: 23.4 NG/ML
ALBUMIN SERPL ELPH-MCNC: 4.8 G/DL
ALP BLD-CCNC: 60 U/L
ALT SERPL-CCNC: 41 U/L
ANION GAP SERPL CALC-SCNC: 11 MMOL/L
AST SERPL-CCNC: 28 U/L
BILIRUB SERPL-MCNC: 0.3 MG/DL
BUN SERPL-MCNC: 16 MG/DL
CALCIUM SERPL-MCNC: 9.7 MG/DL
CHLORIDE SERPL-SCNC: 102 MMOL/L
CHOLEST SERPL-MCNC: 123 MG/DL
CO2 SERPL-SCNC: 24 MMOL/L
CREAT SERPL-MCNC: 0.79 MG/DL
EGFR: 112 ML/MIN/1.73M2
ESTIMATED AVERAGE GLUCOSE: 148 MG/DL
GLUCOSE SERPL-MCNC: 84 MG/DL
HBA1C MFR BLD HPLC: 6.8 %
HCT VFR BLD CALC: 45.6 %
HDLC SERPL-MCNC: 39 MG/DL
HGB BLD-MCNC: 15.1 G/DL
LDLC SERPL CALC-MCNC: 56 MG/DL
MCHC RBC-ENTMCNC: 29.1 PG
MCHC RBC-ENTMCNC: 33.1 GM/DL
MCV RBC AUTO: 87.9 FL
NONHDLC SERPL-MCNC: 84 MG/DL
PLATELET # BLD AUTO: 217 K/UL
POTASSIUM SERPL-SCNC: 4.3 MMOL/L
PROT SERPL-MCNC: 7.4 G/DL
RBC # BLD: 5.19 M/UL
RBC # FLD: 13.6 %
SODIUM SERPL-SCNC: 137 MMOL/L
TRIGL SERPL-MCNC: 163 MG/DL
TSH SERPL-ACNC: 3.09 UIU/ML
URATE SERPL-MCNC: 5.8 MG/DL
WBC # FLD AUTO: 8.85 K/UL

## 2023-07-29 ENCOUNTER — APPOINTMENT (OUTPATIENT)
Dept: RADIOLOGY | Facility: CLINIC | Age: 43
End: 2023-07-29
Payer: MEDICAID

## 2023-07-29 ENCOUNTER — OUTPATIENT (OUTPATIENT)
Dept: OUTPATIENT SERVICES | Facility: HOSPITAL | Age: 43
LOS: 1 days | End: 2023-07-29
Payer: MEDICAID

## 2023-07-29 DIAGNOSIS — M25.562 PAIN IN LEFT KNEE: ICD-10-CM

## 2023-07-29 PROCEDURE — 73564 X-RAY EXAM KNEE 4 OR MORE: CPT | Mod: 26,LT

## 2023-07-29 PROCEDURE — 73564 X-RAY EXAM KNEE 4 OR MORE: CPT

## 2023-08-04 DIAGNOSIS — M25.469 EFFUSION, UNSPECIFIED KNEE: ICD-10-CM

## 2023-08-08 ENCOUNTER — APPOINTMENT (OUTPATIENT)
Dept: ORTHOPEDIC SURGERY | Facility: CLINIC | Age: 43
End: 2023-08-08
Payer: MEDICAID

## 2023-08-08 ENCOUNTER — NON-APPOINTMENT (OUTPATIENT)
Age: 43
End: 2023-08-08

## 2023-08-08 ENCOUNTER — TRANSCRIPTION ENCOUNTER (OUTPATIENT)
Age: 43
End: 2023-08-08

## 2023-08-08 DIAGNOSIS — M25.462 EFFUSION, LEFT KNEE: ICD-10-CM

## 2023-08-08 PROCEDURE — 20610 DRAIN/INJ JOINT/BURSA W/O US: CPT | Mod: LT

## 2023-08-08 PROCEDURE — 99204 OFFICE O/P NEW MOD 45 MIN: CPT | Mod: 25

## 2023-08-09 PROBLEM — M25.462 EFFUSION OF LEFT KNEE: Status: ACTIVE | Noted: 2023-08-09

## 2023-08-09 NOTE — PROCEDURE
[de-identified] : Injection: Left knee joint aspiration and injection Indication: Knee effusion, arthritis  A discussion was had with the patient regarding this procedure and all questions were answered. All risks, benefits and alternatives were discussed. These included but were not limited to bleeding, infection, and allergic reaction. Alcohol was used to clean the skin, and betadine was used to sterilize and prep the area in the lateral aspect of the left knee. Ethyl chloride spray was then used as a topical anesthetic. A 18-gauge needle was used to aspirate synovial fluid and inject 4cc of 2% lidocaine and 1cc of Kenalog into the knee. A sterile bandage was then applied. The patient tolerated the procedure well and there were no complications. 13 cc of clear fluid aspirated prior to injection.

## 2023-08-09 NOTE — DISCUSSION/SUMMARY
[de-identified] : 43-year-old male with chronic atraumatic left knee pain and swelling.  He has a moderate effusion.  Discussed possible etiologies of his condition.  There is mild joint space narrowing on x-ray and he may have a meniscus tear.  He may also have gout.  There is no concern for infection.  He is not having any mechanical symptoms to suggest a large meniscus tear.  Discussed management options including ice, heat, compression wrapping, bracing as needed, Tylenol or anti-inflammatories as needed, and possible cortisone injection.  Knee aspiration and injection was performed.  I recommended knee conditioning exercises.  Recommended follow-up in 2 months for evaluation.

## 2023-08-09 NOTE — HISTORY OF PRESENT ILLNESS
[de-identified] : 43-year-old male presents with 2 months of intermittent left knee pain and swelling.  No associated injury.  He describes pain along the anterior aspect of the knee with radiation posteriorly.  There is no radiation of pain distally.  He has no limitation with walking but does have pain with squatting and deep flexion.  He denies any buckling, locking, catching.  He does feel clicking at times.  He has pain with prolonged standing.  He has not had any treatment but did have x-rays of the left knee on 7/29. PMH: Diabetes

## 2023-08-09 NOTE — PHYSICAL EXAM
[de-identified] : General: No acute distress Mental: Alert and oriented x3 Eyes: Conjunctivitis not seen Chest: Symmetric chest rise, no audible wheezing Skin: Bilateral lower extremities absent from rashes and ulcers Abdomen: No distention  Left knee: Skin: Clean, dry, intact  Inspection: No obvious malalignment, no masses, no swelling, moderate effusion.  Tenderness: positive MJLT. no LJLT. No tenderness over the medial and lateral patella facets. No ttp medial/lateral epicondyle, patella tendon, tibial tubercle, pes anserinus, or proximal fibula.  ROM: 0 to 130 pain with deep flexion Stability: Stable to varus and valgus, negative lachman. Negative anterior/posterior drawer.  Additional tests: Negative McMurrays test, negative Steinmann, Negative patellar grind test.   Strength: 5/5 Q/H/TA/GS/EHL, no atrophy  Neuro: Sensation intact to light touch throughout in dp/sp/tib/marlene/saph nerve distributions Pulses: 2+ DP/PT pulses. [de-identified] : X-rays of the left knee with 4 views on 7/29 available in [facility].  Images show neutral alignment, mild narrowing of the medial joint space with subchondral sclerosis, no osteophytes.  Patella at appropriate height and central position.  Kellgren-Rg 1

## 2023-10-23 ENCOUNTER — APPOINTMENT (OUTPATIENT)
Dept: INTERNAL MEDICINE | Facility: CLINIC | Age: 43
End: 2023-10-23
Payer: MEDICAID

## 2023-10-23 VITALS
BODY MASS INDEX: 31.39 KG/M2 | DIASTOLIC BLOOD PRESSURE: 86 MMHG | HEART RATE: 79 BPM | OXYGEN SATURATION: 97 % | RESPIRATION RATE: 16 BRPM | HEIGHT: 67 IN | SYSTOLIC BLOOD PRESSURE: 132 MMHG | TEMPERATURE: 97.2 F | WEIGHT: 200 LBS

## 2023-10-23 PROCEDURE — 99214 OFFICE O/P EST MOD 30 MIN: CPT

## 2023-10-23 RX ORDER — MELOXICAM 5 MG/1
5 CAPSULE ORAL
Qty: 20 | Refills: 0 | Status: DISCONTINUED | COMMUNITY
Start: 2023-07-24 | End: 2023-10-23

## 2023-10-27 ENCOUNTER — NON-APPOINTMENT (OUTPATIENT)
Age: 43
End: 2023-10-27

## 2023-10-28 LAB
25(OH)D3 SERPL-MCNC: 20.8 NG/ML
ALBUMIN SERPL ELPH-MCNC: 4.8 G/DL
ALP BLD-CCNC: 67 U/L
ALT SERPL-CCNC: 59 U/L
ANION GAP SERPL CALC-SCNC: 11 MMOL/L
AST SERPL-CCNC: 35 U/L
BILIRUB SERPL-MCNC: 0.2 MG/DL
BUN SERPL-MCNC: 17 MG/DL
CALCIUM SERPL-MCNC: 10.3 MG/DL
CHLORIDE SERPL-SCNC: 101 MMOL/L
CHOLEST SERPL-MCNC: 121 MG/DL
CO2 SERPL-SCNC: 29 MMOL/L
CREAT SERPL-MCNC: 1.13 MG/DL
CREAT SPEC-SCNC: 107 MG/DL
EGFR: 82 ML/MIN/1.73M2
ESTIMATED AVERAGE GLUCOSE: 148 MG/DL
GLUCOSE SERPL-MCNC: 102 MG/DL
HBA1C MFR BLD HPLC: 6.8 %
HCT VFR BLD CALC: 48.4 %
HDLC SERPL-MCNC: 37 MG/DL
HGB BLD-MCNC: 15.3 G/DL
LDLC SERPL CALC-MCNC: 50 MG/DL
MCHC RBC-ENTMCNC: 28.8 PG
MCHC RBC-ENTMCNC: 31.6 GM/DL
MCV RBC AUTO: 91 FL
MICROALBUMIN 24H UR DL<=1MG/L-MCNC: 3.9 MG/DL
MICROALBUMIN/CREAT 24H UR-RTO: 37 MG/G
NONHDLC SERPL-MCNC: 84 MG/DL
PLATELET # BLD AUTO: 187 K/UL
POTASSIUM SERPL-SCNC: 4.6 MMOL/L
PROT SERPL-MCNC: 7.5 G/DL
RBC # BLD: 5.32 M/UL
RBC # FLD: 13.5 %
SODIUM SERPL-SCNC: 142 MMOL/L
TRIGL SERPL-MCNC: 210 MG/DL
TSH SERPL-ACNC: 1.6 UIU/ML
WBC # FLD AUTO: 9.18 K/UL

## 2023-11-08 ENCOUNTER — APPOINTMENT (OUTPATIENT)
Dept: ORTHOPEDIC SURGERY | Facility: CLINIC | Age: 43
End: 2023-11-08
Payer: MEDICAID

## 2023-11-08 VITALS — HEIGHT: 67 IN | WEIGHT: 200 LBS | BODY MASS INDEX: 31.39 KG/M2

## 2023-11-08 DIAGNOSIS — M17.12 UNILATERAL PRIMARY OSTEOARTHRITIS, LEFT KNEE: ICD-10-CM

## 2023-11-08 PROCEDURE — 99214 OFFICE O/P EST MOD 30 MIN: CPT | Mod: 25

## 2023-11-08 PROCEDURE — 20610 DRAIN/INJ JOINT/BURSA W/O US: CPT | Mod: LT

## 2023-12-08 ENCOUNTER — APPOINTMENT (OUTPATIENT)
Dept: OPHTHALMOLOGY | Facility: CLINIC | Age: 43
End: 2023-12-08
Payer: MEDICAID

## 2023-12-08 ENCOUNTER — NON-APPOINTMENT (OUTPATIENT)
Age: 43
End: 2023-12-08

## 2023-12-08 PROCEDURE — 92004 COMPRE OPH EXAM NEW PT 1/>: CPT | Mod: 25

## 2023-12-08 PROCEDURE — 92015 DETERMINE REFRACTIVE STATE: CPT | Mod: NC

## 2024-01-22 ENCOUNTER — LABORATORY RESULT (OUTPATIENT)
Age: 44
End: 2024-01-22

## 2024-01-22 ENCOUNTER — RESULT REVIEW (OUTPATIENT)
Age: 44
End: 2024-01-22

## 2024-01-22 ENCOUNTER — APPOINTMENT (OUTPATIENT)
Dept: INTERNAL MEDICINE | Facility: CLINIC | Age: 44
End: 2024-01-22
Payer: MEDICAID

## 2024-01-22 VITALS
HEART RATE: 83 BPM | OXYGEN SATURATION: 98 % | DIASTOLIC BLOOD PRESSURE: 86 MMHG | WEIGHT: 205 LBS | TEMPERATURE: 99.1 F | RESPIRATION RATE: 16 BRPM | SYSTOLIC BLOOD PRESSURE: 131 MMHG | BODY MASS INDEX: 32.18 KG/M2 | HEIGHT: 67 IN

## 2024-01-22 DIAGNOSIS — K76.0 FATTY (CHANGE OF) LIVER, NOT ELSEWHERE CLASSIFIED: ICD-10-CM

## 2024-01-22 PROCEDURE — G2211 COMPLEX E/M VISIT ADD ON: CPT

## 2024-01-22 PROCEDURE — 99214 OFFICE O/P EST MOD 30 MIN: CPT

## 2024-01-25 PROBLEM — K76.0 HEPATIC STEATOSIS: Status: ACTIVE | Noted: 2022-05-25

## 2024-01-25 NOTE — HISTORY OF PRESENT ILLNESS
[FreeTextEntry1] : diabetes testing [de-identified] : 43yoM yaa PMH DM, NAFLD, hypothyroidism, vitiligo presents for follow up  doing well, no complaints adherent to medications does not check BS at home  prior 1/22/24: adherent to medications unable to lose weight  repeat  oral temperature normal  s/p steroid injection left knee - effective

## 2024-01-25 NOTE — PHYSICAL EXAM
[Well Nourished] : well nourished [No Acute Distress] : no acute distress [Well-Appearing] : well-appearing [Well Developed] : well developed [PERRL] : pupils equal round and reactive to light [Normal Sclera/Conjunctiva] : normal sclera/conjunctiva [EOMI] : extraocular movements intact [Normal Outer Ear/Nose] : the outer ears and nose were normal in appearance [Normal Oropharynx] : the oropharynx was normal [No JVD] : no jugular venous distention [No Lymphadenopathy] : no lymphadenopathy [Supple] : supple [Thyroid Normal, No Nodules] : the thyroid was normal and there were no nodules present [No Respiratory Distress] : no respiratory distress  [Clear to Auscultation] : lungs were clear to auscultation bilaterally [Normal Rate] : normal rate  [No Accessory Muscle Use] : no accessory muscle use [Regular Rhythm] : with a regular rhythm [Normal S1, S2] : normal S1 and S2 [No Murmur] : no murmur heard [No Carotid Bruits] : no carotid bruits [No Abdominal Bruit] : a ~M bruit was not heard ~T in the abdomen [No Varicosities] : no varicosities [Pedal Pulses Present] : the pedal pulses are present [No Edema] : there was no peripheral edema [No Palpable Aorta] : no palpable aorta [No Extremity Clubbing/Cyanosis] : no extremity clubbing/cyanosis [Soft] : abdomen soft [Non Tender] : non-tender [Non-distended] : non-distended [No Masses] : no abdominal mass palpated [No HSM] : no HSM [Normal Bowel Sounds] : normal bowel sounds [Normal Posterior Cervical Nodes] : no posterior cervical lymphadenopathy [Normal Anterior Cervical Nodes] : no anterior cervical lymphadenopathy [No Spinal Tenderness] : no spinal tenderness [No CVA Tenderness] : no CVA  tenderness [No Joint Swelling] : no joint swelling [Grossly Normal Strength/Tone] : grossly normal strength/tone [No Rash] : no rash [Coordination Grossly Intact] : coordination grossly intact [Normal Gait] : normal gait [No Focal Deficits] : no focal deficits [Deep Tendon Reflexes (DTR)] : deep tendon reflexes were 2+ and symmetric [Normal Affect] : the affect was normal [Normal Insight/Judgement] : insight and judgment were intact

## 2024-01-26 ENCOUNTER — NON-APPOINTMENT (OUTPATIENT)
Age: 44
End: 2024-01-26

## 2024-01-27 LAB
ALBUMIN SERPL ELPH-MCNC: 4.7 G/DL
ALP BLD-CCNC: 66 U/L
ALT SERPL-CCNC: 55 U/L
ANION GAP SERPL CALC-SCNC: 13 MMOL/L
AST SERPL-CCNC: 36 U/L
BILIRUB SERPL-MCNC: 0.2 MG/DL
BUN SERPL-MCNC: 23 MG/DL
CALCIUM SERPL-MCNC: 9.8 MG/DL
CHLORIDE SERPL-SCNC: 102 MMOL/L
CO2 SERPL-SCNC: 24 MMOL/L
CREAT SERPL-MCNC: 1.04 MG/DL
CREAT SPEC-SCNC: 186 MG/DL
EGFR: 91 ML/MIN/1.73M2
ESTIMATED AVERAGE GLUCOSE: 143 MG/DL
GLUCOSE SERPL-MCNC: 89 MG/DL
HBA1C MFR BLD HPLC: 6.6 %
MICROALBUMIN 24H UR DL<=1MG/L-MCNC: 9.4 MG/DL
MICROALBUMIN/CREAT 24H UR-RTO: 51 MG/G
POTASSIUM SERPL-SCNC: 4.2 MMOL/L
PROT SERPL-MCNC: 7.5 G/DL
SODIUM SERPL-SCNC: 138 MMOL/L
TSH SERPL-ACNC: 4.97 UIU/ML
VIT B12 SERPL-MCNC: 233 PG/ML

## 2024-02-23 ENCOUNTER — APPOINTMENT (OUTPATIENT)
Dept: ULTRASOUND IMAGING | Facility: CLINIC | Age: 44
End: 2024-02-23
Payer: MEDICAID

## 2024-02-23 PROCEDURE — 76705 ECHO EXAM OF ABDOMEN: CPT

## 2024-03-12 ENCOUNTER — APPOINTMENT (OUTPATIENT)
Dept: DERMATOLOGY | Facility: CLINIC | Age: 44
End: 2024-03-12

## 2024-05-15 ENCOUNTER — APPOINTMENT (OUTPATIENT)
Dept: INTERNAL MEDICINE | Facility: CLINIC | Age: 44
End: 2024-05-15
Payer: MEDICAID

## 2024-05-15 VITALS
HEART RATE: 67 BPM | BODY MASS INDEX: 29.82 KG/M2 | DIASTOLIC BLOOD PRESSURE: 83 MMHG | WEIGHT: 190 LBS | RESPIRATION RATE: 16 BRPM | OXYGEN SATURATION: 98 % | TEMPERATURE: 97.8 F | SYSTOLIC BLOOD PRESSURE: 130 MMHG | HEIGHT: 67 IN

## 2024-05-15 DIAGNOSIS — K76.0 FATTY (CHANGE OF) LIVER, NOT ELSEWHERE CLASSIFIED: ICD-10-CM

## 2024-05-15 DIAGNOSIS — Z12.11 ENCOUNTER FOR SCREENING FOR MALIGNANT NEOPLASM OF COLON: ICD-10-CM

## 2024-05-15 DIAGNOSIS — E03.9 HYPOTHYROIDISM, UNSPECIFIED: ICD-10-CM

## 2024-05-15 DIAGNOSIS — E11.9 TYPE 2 DIABETES MELLITUS W/OUT COMPLICATIONS: ICD-10-CM

## 2024-05-15 PROCEDURE — 99214 OFFICE O/P EST MOD 30 MIN: CPT

## 2024-05-15 PROCEDURE — G2211 COMPLEX E/M VISIT ADD ON: CPT | Mod: NC,1L

## 2024-05-15 RX ORDER — ERGOCALCIFEROL 1.25 MG/1
1.25 MG CAPSULE, LIQUID FILLED ORAL
Qty: 12 | Refills: 1 | Status: DISCONTINUED | COMMUNITY
Start: 2020-10-11 | End: 2024-05-15

## 2024-05-16 PROBLEM — K76.0 NAFLD (NONALCOHOLIC FATTY LIVER DISEASE): Status: ACTIVE | Noted: 2022-05-30

## 2024-05-16 PROBLEM — E11.9 DIABETES MELLITUS, TYPE 2: Status: ACTIVE | Noted: 2020-10-11

## 2024-05-16 PROBLEM — E03.9 HYPOTHYROIDISM, ADULT: Status: ACTIVE | Noted: 2020-10-11

## 2024-05-16 NOTE — ASSESSMENT
[FreeTextEntry1] : 44yoM metabolic syndrome, declined GLP1 agonist, losing weight independently if unsuccessful in obtaining target weight, will revisit semaglutde

## 2024-05-16 NOTE — PLAN
[FreeTextEntry1] : RTC 4 mos   During today's visit, I spent 30 minutes reviewing the patient's medical chart, addressing the patient's concerns, and discussing their treatment plan in detail. We reviewed the results of recent tests and discussed the next steps in their care, including medication adjustments and follow-up appointments. Additionally, I took the time to answer the patient's questions and provide education on managing their condition at home.  This extended consultation allowed for a comprehensive assessment and personalized approach to the patient, reflecting the complexity and time required for today's visit.

## 2024-05-16 NOTE — HISTORY OF PRESENT ILLNESS
[FreeTextEntry1] : diabetes follow up [de-identified] : 44yoM yaa originally from [facility] PMH DM2, MAFLD/fibrosis, hypothyroidism, vitiligo presents for follow up  doing well, no complaints adherent to medications does not check BS at home  prior 1/22/24: adherent to medications unable to lose weight  repeat  oral temperature normal  s/p steroid injection left knee - effective  5.15.24: lost 15lb over 5 mos w/ intermittent fasting left knee pain resolved  not adherent to lipitor - fatigue  has f/u dermatology for vitiligo this month

## 2024-05-21 DIAGNOSIS — E06.3 AUTOIMMUNE THYROIDITIS: ICD-10-CM

## 2024-05-22 LAB
ALBUMIN SERPL ELPH-MCNC: 4.9 G/DL
ALP BLD-CCNC: 68 U/L
ALT SERPL-CCNC: 51 U/L
ANION GAP SERPL CALC-SCNC: 12 MMOL/L
APO LP(A) SERPL-MCNC: <9 NMOL/L
AST SERPL-CCNC: 38 U/L
BILIRUB SERPL-MCNC: 0.2 MG/DL
BUN SERPL-MCNC: 13 MG/DL
CALCIUM SERPL-MCNC: 10.3 MG/DL
CHLORIDE SERPL-SCNC: 103 MMOL/L
CHOLEST SERPL-MCNC: 166 MG/DL
CO2 SERPL-SCNC: 26 MMOL/L
CREAT SERPL-MCNC: 0.88 MG/DL
CREAT SPEC-SCNC: 51 MG/DL
CRP SERPL HS-MCNC: 3.05 MG/L
EGFR: 108 ML/MIN/1.73M2
ESTIMATED AVERAGE GLUCOSE: 143 MG/DL
GLUCOSE SERPL-MCNC: 79 MG/DL
HBA1C MFR BLD HPLC: 6.6 %
HCT VFR BLD CALC: 46.7 %
HDLC SERPL-MCNC: 44 MG/DL
HGB BLD-MCNC: 15.1 G/DL
LDLC SERPL CALC-MCNC: 94 MG/DL
MCHC RBC-ENTMCNC: 28.5 PG
MCHC RBC-ENTMCNC: 32.3 GM/DL
MCV RBC AUTO: 88.3 FL
MICROALBUMIN 24H UR DL<=1MG/L-MCNC: 2.8 MG/DL
MICROALBUMIN/CREAT 24H UR-RTO: 55 MG/G
NONHDLC SERPL-MCNC: 123 MG/DL
PLATELET # BLD AUTO: 198 K/UL
POTASSIUM SERPL-SCNC: 5.1 MMOL/L
PROT SERPL-MCNC: 7.6 G/DL
RBC # BLD: 5.29 M/UL
RBC # FLD: 14 %
SODIUM SERPL-SCNC: 140 MMOL/L
THYROGLOB AB SERPL-ACNC: 47.7 IU/ML
THYROPEROXIDASE AB SERPL IA-ACNC: 1106 IU/ML
TRIGL SERPL-MCNC: 167 MG/DL
TSH SERPL-ACNC: 2.64 UIU/ML
WBC # FLD AUTO: 8.93 K/UL

## 2024-05-22 RX ORDER — LEVOTHYROXINE SODIUM 0.12 MG/1
125 TABLET ORAL
Qty: 90 | Refills: 1 | Status: ACTIVE | COMMUNITY
Start: 2022-07-06 | End: 1900-01-01

## 2024-05-22 RX ORDER — ATORVASTATIN CALCIUM 10 MG/1
10 TABLET, FILM COATED ORAL
Qty: 90 | Refills: 3 | Status: ACTIVE | COMMUNITY
Start: 2020-10-11 | End: 1900-01-01

## 2024-05-22 RX ORDER — LOSARTAN POTASSIUM 25 MG/1
25 TABLET, FILM COATED ORAL
Qty: 90 | Refills: 3 | Status: ACTIVE | COMMUNITY
Start: 2024-01-27 | End: 1900-01-01

## 2024-05-22 RX ORDER — METFORMIN HYDROCHLORIDE 850 MG/1
850 TABLET, COATED ORAL TWICE DAILY
Qty: 180 | Refills: 3 | Status: ACTIVE | COMMUNITY
Start: 2022-06-08 | End: 1900-01-01

## 2024-05-28 ENCOUNTER — APPOINTMENT (OUTPATIENT)
Dept: DERMATOLOGY | Facility: CLINIC | Age: 44
End: 2024-05-28
Payer: MEDICAID

## 2024-05-28 DIAGNOSIS — L80 VITILIGO: ICD-10-CM

## 2024-05-28 DIAGNOSIS — L21.9 SEBORRHEIC DERMATITIS, UNSPECIFIED: ICD-10-CM

## 2024-05-28 PROCEDURE — 99214 OFFICE O/P EST MOD 30 MIN: CPT

## 2024-05-28 RX ORDER — FLUOCINONIDE 0.5 MG/ML
0.05 SOLUTION TOPICAL
Qty: 1 | Refills: 6 | Status: ACTIVE | COMMUNITY
Start: 2024-05-28 | End: 1900-01-01

## 2024-05-28 RX ORDER — TRIAMCINOLONE ACETONIDE 1 MG/G
0.1 CREAM TOPICAL
Qty: 1 | Refills: 0 | Status: ACTIVE | COMMUNITY
Start: 2024-05-28 | End: 1900-01-01

## 2024-05-28 RX ORDER — TACROLIMUS 1 MG/G
0.1 OINTMENT TOPICAL
Qty: 1 | Refills: 3 | Status: ACTIVE | COMMUNITY
Start: 2024-05-28 | End: 1900-01-01

## 2024-05-28 RX ORDER — KETOCONAZOLE 20.5 MG/ML
2 SHAMPOO, SUSPENSION TOPICAL
Qty: 1 | Refills: 11 | Status: ACTIVE | COMMUNITY
Start: 2024-05-28 | End: 1900-01-01

## 2024-07-15 ENCOUNTER — APPOINTMENT (OUTPATIENT)
Dept: DERMATOLOGY | Facility: CLINIC | Age: 44
End: 2024-07-15
Payer: MEDICAID

## 2024-07-15 PROCEDURE — 96900 ACTINOTHERAPY UV LIGHT: CPT

## 2024-07-17 ENCOUNTER — APPOINTMENT (OUTPATIENT)
Dept: DERMATOLOGY | Facility: CLINIC | Age: 44
End: 2024-07-17
Payer: MEDICAID

## 2024-07-17 PROCEDURE — 96900 ACTINOTHERAPY UV LIGHT: CPT

## 2024-07-19 ENCOUNTER — APPOINTMENT (OUTPATIENT)
Dept: DERMATOLOGY | Facility: CLINIC | Age: 44
End: 2024-07-19
Payer: MEDICAID

## 2024-07-19 PROCEDURE — 96900 ACTINOTHERAPY UV LIGHT: CPT

## 2024-07-22 ENCOUNTER — APPOINTMENT (OUTPATIENT)
Dept: DERMATOLOGY | Facility: CLINIC | Age: 44
End: 2024-07-22
Payer: MEDICAID

## 2024-07-22 PROCEDURE — 96900 ACTINOTHERAPY UV LIGHT: CPT

## 2024-07-24 ENCOUNTER — APPOINTMENT (OUTPATIENT)
Dept: DERMATOLOGY | Facility: CLINIC | Age: 44
End: 2024-07-24

## 2024-07-26 ENCOUNTER — APPOINTMENT (OUTPATIENT)
Dept: DERMATOLOGY | Facility: CLINIC | Age: 44
End: 2024-07-26

## 2024-07-29 ENCOUNTER — APPOINTMENT (OUTPATIENT)
Dept: DERMATOLOGY | Facility: CLINIC | Age: 44
End: 2024-07-29
Payer: MEDICAID

## 2024-07-29 PROCEDURE — 96900 ACTINOTHERAPY UV LIGHT: CPT

## 2024-07-31 ENCOUNTER — APPOINTMENT (OUTPATIENT)
Dept: DERMATOLOGY | Facility: CLINIC | Age: 44
End: 2024-07-31
Payer: MEDICAID

## 2024-07-31 PROCEDURE — 96900 ACTINOTHERAPY UV LIGHT: CPT

## 2024-08-01 ENCOUNTER — RX RENEWAL (OUTPATIENT)
Age: 44
End: 2024-08-01

## 2024-08-02 ENCOUNTER — APPOINTMENT (OUTPATIENT)
Dept: DERMATOLOGY | Facility: CLINIC | Age: 44
End: 2024-08-02
Payer: MEDICAID

## 2024-08-02 PROCEDURE — 96900 ACTINOTHERAPY UV LIGHT: CPT

## 2024-08-05 ENCOUNTER — APPOINTMENT (OUTPATIENT)
Dept: DERMATOLOGY | Facility: CLINIC | Age: 44
End: 2024-08-05

## 2024-08-05 PROCEDURE — 96900 ACTINOTHERAPY UV LIGHT: CPT

## 2024-08-07 ENCOUNTER — APPOINTMENT (OUTPATIENT)
Dept: DERMATOLOGY | Facility: CLINIC | Age: 44
End: 2024-08-07

## 2024-08-07 PROCEDURE — 96900 ACTINOTHERAPY UV LIGHT: CPT

## 2024-08-09 ENCOUNTER — APPOINTMENT (OUTPATIENT)
Dept: DERMATOLOGY | Facility: CLINIC | Age: 44
End: 2024-08-09

## 2024-08-09 PROCEDURE — 96900 ACTINOTHERAPY UV LIGHT: CPT

## 2024-08-12 ENCOUNTER — APPOINTMENT (OUTPATIENT)
Dept: DERMATOLOGY | Facility: CLINIC | Age: 44
End: 2024-08-12
Payer: MEDICAID

## 2024-08-12 PROCEDURE — 96900 ACTINOTHERAPY UV LIGHT: CPT

## 2024-08-14 ENCOUNTER — APPOINTMENT (OUTPATIENT)
Dept: DERMATOLOGY | Facility: CLINIC | Age: 44
End: 2024-08-14
Payer: MEDICAID

## 2024-08-14 PROCEDURE — 96900 ACTINOTHERAPY UV LIGHT: CPT

## 2024-08-16 ENCOUNTER — APPOINTMENT (OUTPATIENT)
Dept: DERMATOLOGY | Facility: CLINIC | Age: 44
End: 2024-08-16
Payer: MEDICAID

## 2024-08-16 PROCEDURE — 96900 ACTINOTHERAPY UV LIGHT: CPT

## 2024-08-19 ENCOUNTER — APPOINTMENT (OUTPATIENT)
Dept: DERMATOLOGY | Facility: CLINIC | Age: 44
End: 2024-08-19
Payer: MEDICAID

## 2024-08-19 PROCEDURE — 96900 ACTINOTHERAPY UV LIGHT: CPT

## 2024-08-21 ENCOUNTER — APPOINTMENT (OUTPATIENT)
Dept: DERMATOLOGY | Facility: CLINIC | Age: 44
End: 2024-08-21
Payer: MEDICAID

## 2024-08-21 PROCEDURE — 96900 ACTINOTHERAPY UV LIGHT: CPT

## 2024-08-23 ENCOUNTER — APPOINTMENT (OUTPATIENT)
Dept: DERMATOLOGY | Facility: CLINIC | Age: 44
End: 2024-08-23
Payer: MEDICAID

## 2024-08-23 PROCEDURE — 96900 ACTINOTHERAPY UV LIGHT: CPT

## 2024-08-26 ENCOUNTER — APPOINTMENT (OUTPATIENT)
Dept: DERMATOLOGY | Facility: CLINIC | Age: 44
End: 2024-08-26
Payer: MEDICAID

## 2024-08-26 PROCEDURE — 96900 ACTINOTHERAPY UV LIGHT: CPT

## 2024-08-28 ENCOUNTER — APPOINTMENT (OUTPATIENT)
Dept: DERMATOLOGY | Facility: CLINIC | Age: 44
End: 2024-08-28
Payer: MEDICAID

## 2024-08-28 PROCEDURE — 96900 ACTINOTHERAPY UV LIGHT: CPT

## 2024-08-30 ENCOUNTER — APPOINTMENT (OUTPATIENT)
Dept: DERMATOLOGY | Facility: CLINIC | Age: 44
End: 2024-08-30

## 2024-09-04 ENCOUNTER — APPOINTMENT (OUTPATIENT)
Dept: DERMATOLOGY | Facility: CLINIC | Age: 44
End: 2024-09-04
Payer: MEDICAID

## 2024-09-04 PROCEDURE — 96900 ACTINOTHERAPY UV LIGHT: CPT

## 2024-09-06 ENCOUNTER — APPOINTMENT (OUTPATIENT)
Dept: DERMATOLOGY | Facility: CLINIC | Age: 44
End: 2024-09-06
Payer: MEDICAID

## 2024-09-06 PROCEDURE — 96900 ACTINOTHERAPY UV LIGHT: CPT

## 2024-09-09 ENCOUNTER — APPOINTMENT (OUTPATIENT)
Dept: DERMATOLOGY | Facility: CLINIC | Age: 44
End: 2024-09-09
Payer: MEDICAID

## 2024-09-09 PROCEDURE — 96900 ACTINOTHERAPY UV LIGHT: CPT

## 2024-09-11 ENCOUNTER — APPOINTMENT (OUTPATIENT)
Dept: DERMATOLOGY | Facility: CLINIC | Age: 44
End: 2024-09-11
Payer: MEDICAID

## 2024-09-11 PROCEDURE — 96900 ACTINOTHERAPY UV LIGHT: CPT

## 2024-09-13 ENCOUNTER — APPOINTMENT (OUTPATIENT)
Dept: DERMATOLOGY | Facility: CLINIC | Age: 44
End: 2024-09-13
Payer: MEDICAID

## 2024-09-13 PROCEDURE — 96900 ACTINOTHERAPY UV LIGHT: CPT

## 2024-09-15 ENCOUNTER — NON-APPOINTMENT (OUTPATIENT)
Age: 44
End: 2024-09-15

## 2024-09-16 ENCOUNTER — APPOINTMENT (OUTPATIENT)
Dept: DERMATOLOGY | Facility: CLINIC | Age: 44
End: 2024-09-16
Payer: MEDICAID

## 2024-09-16 ENCOUNTER — APPOINTMENT (OUTPATIENT)
Dept: INTERNAL MEDICINE | Facility: CLINIC | Age: 44
End: 2024-09-16
Payer: MEDICAID

## 2024-09-16 VITALS
TEMPERATURE: 98 F | RESPIRATION RATE: 16 BRPM | WEIGHT: 195 LBS | HEIGHT: 67 IN | BODY MASS INDEX: 30.61 KG/M2 | HEART RATE: 71 BPM | DIASTOLIC BLOOD PRESSURE: 91 MMHG | SYSTOLIC BLOOD PRESSURE: 138 MMHG | OXYGEN SATURATION: 98 %

## 2024-09-16 DIAGNOSIS — K76.0 FATTY (CHANGE OF) LIVER, NOT ELSEWHERE CLASSIFIED: ICD-10-CM

## 2024-09-16 DIAGNOSIS — L80 VITILIGO: ICD-10-CM

## 2024-09-16 DIAGNOSIS — E11.9 TYPE 2 DIABETES MELLITUS W/OUT COMPLICATIONS: ICD-10-CM

## 2024-09-16 PROCEDURE — 96900 ACTINOTHERAPY UV LIGHT: CPT

## 2024-09-16 PROCEDURE — G2211 COMPLEX E/M VISIT ADD ON: CPT | Mod: NC

## 2024-09-16 PROCEDURE — 99214 OFFICE O/P EST MOD 30 MIN: CPT

## 2024-09-16 PROCEDURE — 90656 IIV3 VACC NO PRSV 0.5 ML IM: CPT

## 2024-09-16 PROCEDURE — G0008: CPT

## 2024-09-16 NOTE — PLAN
[FreeTextEntry1] : RTC 3 mos   During today's visit, I spent 30 minutes reviewing the patient's medical chart, addressing the patient's concerns, and discussing their treatment plan in detail. We reviewed the results of recent tests and discussed the next steps in their care, including medication adjustments and follow-up appointments. Additionally, I took the time to answer the patient's questions and provide education on managing their condition at home.  This extended consultation allowed for a comprehensive assessment and personalized approach to the patient, reflecting the complexity and time required for today's visit.

## 2024-09-16 NOTE — HISTORY OF PRESENT ILLNESS
[FreeTextEntry1] : routine follow up [de-identified] : 44yoM yaa PMH DM, NAFLD, hypothyroidism, vitiligo presents for follow up  doing well, no complaints adherent to medications does not check BS at home  prior 1/22/24: adherent to medications unable to lose weight  repeat  oral temperature normal  s/p steroid injection left knee - effective  9.16.24: phototherapy effective  adherent to medications no complaints

## 2024-09-17 LAB
ALBUMIN SERPL ELPH-MCNC: 4.8 G/DL
ALP BLD-CCNC: 78 U/L
ALT SERPL-CCNC: 66 U/L
ANION GAP SERPL CALC-SCNC: 15 MMOL/L
AST SERPL-CCNC: 44 U/L
BILIRUB SERPL-MCNC: 0.3 MG/DL
BUN SERPL-MCNC: 13 MG/DL
CALCIUM SERPL-MCNC: 10.2 MG/DL
CHLORIDE SERPL-SCNC: 102 MMOL/L
CHOLEST SERPL-MCNC: 122 MG/DL
CO2 SERPL-SCNC: 20 MMOL/L
CREAT SERPL-MCNC: 0.8 MG/DL
CREAT SPEC-SCNC: 73 MG/DL
EGFR: 111 ML/MIN/1.73M2
ESTIMATED AVERAGE GLUCOSE: 154 MG/DL
GLUCOSE SERPL-MCNC: 104 MG/DL
HBA1C MFR BLD HPLC: 7 %
HDLC SERPL-MCNC: 38 MG/DL
LDLC SERPL CALC-MCNC: 51 MG/DL
MICROALBUMIN 24H UR DL<=1MG/L-MCNC: 3.7 MG/DL
MICROALBUMIN/CREAT 24H UR-RTO: 51 MG/G
NONHDLC SERPL-MCNC: 84 MG/DL
POTASSIUM SERPL-SCNC: 4 MMOL/L
PROT SERPL-MCNC: 7.6 G/DL
SODIUM SERPL-SCNC: 138 MMOL/L
TRIGL SERPL-MCNC: 204 MG/DL
VIT B12 SERPL-MCNC: 248 PG/ML

## 2024-09-18 ENCOUNTER — APPOINTMENT (OUTPATIENT)
Dept: DERMATOLOGY | Facility: CLINIC | Age: 44
End: 2024-09-18

## 2024-09-20 ENCOUNTER — APPOINTMENT (OUTPATIENT)
Dept: DERMATOLOGY | Facility: CLINIC | Age: 44
End: 2024-09-20
Payer: MEDICAID

## 2024-09-20 PROCEDURE — 96900 ACTINOTHERAPY UV LIGHT: CPT

## 2024-09-23 ENCOUNTER — APPOINTMENT (OUTPATIENT)
Dept: DERMATOLOGY | Facility: CLINIC | Age: 44
End: 2024-09-23
Payer: MEDICAID

## 2024-09-23 PROCEDURE — 96900 ACTINOTHERAPY UV LIGHT: CPT

## 2024-09-25 ENCOUNTER — APPOINTMENT (OUTPATIENT)
Dept: DERMATOLOGY | Facility: CLINIC | Age: 44
End: 2024-09-25
Payer: MEDICAID

## 2024-09-25 PROCEDURE — 96900 ACTINOTHERAPY UV LIGHT: CPT

## 2024-09-27 ENCOUNTER — APPOINTMENT (OUTPATIENT)
Dept: DERMATOLOGY | Facility: CLINIC | Age: 44
End: 2024-09-27
Payer: MEDICAID

## 2024-09-27 PROCEDURE — 96900 ACTINOTHERAPY UV LIGHT: CPT

## 2024-09-30 ENCOUNTER — APPOINTMENT (OUTPATIENT)
Dept: DERMATOLOGY | Facility: CLINIC | Age: 44
End: 2024-09-30
Payer: MEDICAID

## 2024-09-30 PROCEDURE — 96900 ACTINOTHERAPY UV LIGHT: CPT

## 2024-10-02 ENCOUNTER — APPOINTMENT (OUTPATIENT)
Dept: DERMATOLOGY | Facility: CLINIC | Age: 44
End: 2024-10-02
Payer: MEDICAID

## 2024-10-02 PROCEDURE — 96900 ACTINOTHERAPY UV LIGHT: CPT

## 2024-10-04 ENCOUNTER — APPOINTMENT (OUTPATIENT)
Dept: DERMATOLOGY | Facility: CLINIC | Age: 44
End: 2024-10-04
Payer: MEDICAID

## 2024-10-04 PROCEDURE — 96900 ACTINOTHERAPY UV LIGHT: CPT

## 2024-10-07 ENCOUNTER — APPOINTMENT (OUTPATIENT)
Dept: DERMATOLOGY | Facility: CLINIC | Age: 44
End: 2024-10-07
Payer: MEDICAID

## 2024-10-07 PROCEDURE — 96900 ACTINOTHERAPY UV LIGHT: CPT

## 2024-10-09 ENCOUNTER — APPOINTMENT (OUTPATIENT)
Dept: DERMATOLOGY | Facility: CLINIC | Age: 44
End: 2024-10-09
Payer: MEDICAID

## 2024-10-09 PROCEDURE — 96900 ACTINOTHERAPY UV LIGHT: CPT

## 2024-10-11 ENCOUNTER — APPOINTMENT (OUTPATIENT)
Dept: DERMATOLOGY | Facility: CLINIC | Age: 44
End: 2024-10-11
Payer: MEDICAID

## 2024-10-11 PROCEDURE — 96900 ACTINOTHERAPY UV LIGHT: CPT

## 2024-10-12 ENCOUNTER — NON-APPOINTMENT (OUTPATIENT)
Age: 44
End: 2024-10-12

## 2024-10-14 ENCOUNTER — APPOINTMENT (OUTPATIENT)
Dept: DERMATOLOGY | Facility: CLINIC | Age: 44
End: 2024-10-14
Payer: MEDICAID

## 2024-10-14 PROCEDURE — 96900 ACTINOTHERAPY UV LIGHT: CPT

## 2024-10-16 ENCOUNTER — APPOINTMENT (OUTPATIENT)
Dept: DERMATOLOGY | Facility: CLINIC | Age: 44
End: 2024-10-16
Payer: MEDICAID

## 2024-10-16 PROCEDURE — 96900 ACTINOTHERAPY UV LIGHT: CPT

## 2024-10-17 ENCOUNTER — OUTPATIENT (OUTPATIENT)
Dept: OUTPATIENT SERVICES | Facility: HOSPITAL | Age: 44
LOS: 1 days | End: 2024-10-17
Payer: MEDICAID

## 2024-10-17 ENCOUNTER — RESULT REVIEW (OUTPATIENT)
Age: 44
End: 2024-10-17

## 2024-10-17 ENCOUNTER — APPOINTMENT (OUTPATIENT)
Dept: MRI IMAGING | Facility: CLINIC | Age: 44
End: 2024-10-17

## 2024-10-17 DIAGNOSIS — K76.0 FATTY (CHANGE OF) LIVER, NOT ELSEWHERE CLASSIFIED: ICD-10-CM

## 2024-10-17 PROCEDURE — 74181 MRI ABDOMEN W/O CONTRAST: CPT

## 2024-10-17 PROCEDURE — 76391 MR ELASTOGRAPHY: CPT

## 2024-10-18 ENCOUNTER — APPOINTMENT (OUTPATIENT)
Dept: DERMATOLOGY | Facility: CLINIC | Age: 44
End: 2024-10-18
Payer: MEDICAID

## 2024-10-18 PROCEDURE — 96900 ACTINOTHERAPY UV LIGHT: CPT

## 2024-10-21 ENCOUNTER — APPOINTMENT (OUTPATIENT)
Dept: DERMATOLOGY | Facility: CLINIC | Age: 44
End: 2024-10-21
Payer: MEDICAID

## 2024-10-21 PROCEDURE — 96900 ACTINOTHERAPY UV LIGHT: CPT

## 2024-10-23 ENCOUNTER — APPOINTMENT (OUTPATIENT)
Dept: DERMATOLOGY | Facility: CLINIC | Age: 44
End: 2024-10-23
Payer: MEDICAID

## 2024-10-23 PROCEDURE — 96900 ACTINOTHERAPY UV LIGHT: CPT

## 2024-10-25 ENCOUNTER — APPOINTMENT (OUTPATIENT)
Dept: DERMATOLOGY | Facility: CLINIC | Age: 44
End: 2024-10-25
Payer: MEDICAID

## 2024-10-25 PROCEDURE — 96900 ACTINOTHERAPY UV LIGHT: CPT

## 2024-10-26 ENCOUNTER — NON-APPOINTMENT (OUTPATIENT)
Age: 44
End: 2024-10-26

## 2024-10-28 ENCOUNTER — APPOINTMENT (OUTPATIENT)
Dept: DERMATOLOGY | Facility: CLINIC | Age: 44
End: 2024-10-28
Payer: MEDICAID

## 2024-10-28 PROCEDURE — 96900 ACTINOTHERAPY UV LIGHT: CPT

## 2024-10-30 ENCOUNTER — APPOINTMENT (OUTPATIENT)
Dept: DERMATOLOGY | Facility: CLINIC | Age: 44
End: 2024-10-30
Payer: MEDICAID

## 2024-10-30 PROCEDURE — 96900 ACTINOTHERAPY UV LIGHT: CPT

## 2024-11-01 ENCOUNTER — APPOINTMENT (OUTPATIENT)
Dept: INTERNAL MEDICINE | Facility: CLINIC | Age: 44
End: 2024-11-01
Payer: MEDICAID

## 2024-11-01 ENCOUNTER — APPOINTMENT (OUTPATIENT)
Dept: DERMATOLOGY | Facility: CLINIC | Age: 44
End: 2024-11-01
Payer: MEDICAID

## 2024-11-01 DIAGNOSIS — K75.81 NONALCOHOLIC STEATOHEPATITIS (NASH): ICD-10-CM

## 2024-11-01 DIAGNOSIS — K74.00 HEPATIC FIBROSIS, UNSPECIFIED: ICD-10-CM

## 2024-11-01 PROCEDURE — 96900 ACTINOTHERAPY UV LIGHT: CPT

## 2024-11-01 PROCEDURE — 99442: CPT | Mod: 93

## 2024-11-04 ENCOUNTER — APPOINTMENT (OUTPATIENT)
Dept: DERMATOLOGY | Facility: CLINIC | Age: 44
End: 2024-11-04
Payer: MEDICAID

## 2024-11-04 PROCEDURE — 96900 ACTINOTHERAPY UV LIGHT: CPT

## 2024-11-06 ENCOUNTER — APPOINTMENT (OUTPATIENT)
Dept: DERMATOLOGY | Facility: CLINIC | Age: 44
End: 2024-11-06
Payer: MEDICAID

## 2024-11-06 PROCEDURE — 96900 ACTINOTHERAPY UV LIGHT: CPT

## 2024-11-08 ENCOUNTER — APPOINTMENT (OUTPATIENT)
Dept: DERMATOLOGY | Facility: CLINIC | Age: 44
End: 2024-11-08
Payer: MEDICAID

## 2024-11-08 PROCEDURE — 96900 ACTINOTHERAPY UV LIGHT: CPT

## 2024-11-11 ENCOUNTER — APPOINTMENT (OUTPATIENT)
Dept: DERMATOLOGY | Facility: CLINIC | Age: 44
End: 2024-11-11
Payer: MEDICAID

## 2024-11-11 PROCEDURE — 96900 ACTINOTHERAPY UV LIGHT: CPT

## 2024-11-13 ENCOUNTER — APPOINTMENT (OUTPATIENT)
Dept: DERMATOLOGY | Facility: CLINIC | Age: 44
End: 2024-11-13
Payer: MEDICAID

## 2024-11-13 PROCEDURE — 96900 ACTINOTHERAPY UV LIGHT: CPT

## 2024-11-15 ENCOUNTER — APPOINTMENT (OUTPATIENT)
Dept: DERMATOLOGY | Facility: CLINIC | Age: 44
End: 2024-11-15
Payer: MEDICAID

## 2024-11-15 PROCEDURE — 96900 ACTINOTHERAPY UV LIGHT: CPT

## 2024-11-18 ENCOUNTER — APPOINTMENT (OUTPATIENT)
Dept: DERMATOLOGY | Facility: CLINIC | Age: 44
End: 2024-11-18
Payer: MEDICAID

## 2024-11-18 PROCEDURE — 96900 ACTINOTHERAPY UV LIGHT: CPT

## 2024-11-20 ENCOUNTER — APPOINTMENT (OUTPATIENT)
Dept: DERMATOLOGY | Facility: CLINIC | Age: 44
End: 2024-11-20
Payer: MEDICAID

## 2024-11-20 PROCEDURE — 96900 ACTINOTHERAPY UV LIGHT: CPT

## 2024-11-22 ENCOUNTER — APPOINTMENT (OUTPATIENT)
Dept: DERMATOLOGY | Facility: CLINIC | Age: 44
End: 2024-11-22
Payer: MEDICAID

## 2024-11-22 PROCEDURE — 96900 ACTINOTHERAPY UV LIGHT: CPT

## 2024-11-25 ENCOUNTER — APPOINTMENT (OUTPATIENT)
Dept: DERMATOLOGY | Facility: CLINIC | Age: 44
End: 2024-11-25
Payer: MEDICAID

## 2024-11-25 PROCEDURE — 96900 ACTINOTHERAPY UV LIGHT: CPT

## 2024-11-27 ENCOUNTER — APPOINTMENT (OUTPATIENT)
Dept: DERMATOLOGY | Facility: CLINIC | Age: 44
End: 2024-11-27
Payer: MEDICAID

## 2024-11-27 PROCEDURE — 96900 ACTINOTHERAPY UV LIGHT: CPT

## 2024-11-29 ENCOUNTER — APPOINTMENT (OUTPATIENT)
Dept: DERMATOLOGY | Facility: CLINIC | Age: 44
End: 2024-11-29
Payer: MEDICAID

## 2024-11-29 PROCEDURE — 96900 ACTINOTHERAPY UV LIGHT: CPT

## 2024-12-02 ENCOUNTER — APPOINTMENT (OUTPATIENT)
Dept: DERMATOLOGY | Facility: CLINIC | Age: 44
End: 2024-12-02
Payer: MEDICAID

## 2024-12-02 PROCEDURE — 96900 ACTINOTHERAPY UV LIGHT: CPT

## 2024-12-02 PROCEDURE — 96910 PHOTCHMTX TAR&UVB/PTRLTM&UVB: CPT

## 2024-12-03 ENCOUNTER — APPOINTMENT (OUTPATIENT)
Dept: DERMATOLOGY | Facility: CLINIC | Age: 44
End: 2024-12-03
Payer: MEDICAID

## 2024-12-03 VITALS — HEIGHT: 67 IN | WEIGHT: 195 LBS | BODY MASS INDEX: 30.61 KG/M2

## 2024-12-03 DIAGNOSIS — L80 VITILIGO: ICD-10-CM

## 2024-12-03 PROCEDURE — 99214 OFFICE O/P EST MOD 30 MIN: CPT

## 2024-12-04 ENCOUNTER — APPOINTMENT (OUTPATIENT)
Dept: DERMATOLOGY | Facility: CLINIC | Age: 44
End: 2024-12-04
Payer: MEDICAID

## 2024-12-04 PROCEDURE — 96900 ACTINOTHERAPY UV LIGHT: CPT

## 2024-12-06 ENCOUNTER — APPOINTMENT (OUTPATIENT)
Dept: DERMATOLOGY | Facility: CLINIC | Age: 44
End: 2024-12-06
Payer: MEDICAID

## 2024-12-06 ENCOUNTER — RX RENEWAL (OUTPATIENT)
Age: 44
End: 2024-12-06

## 2024-12-06 PROCEDURE — 96900 ACTINOTHERAPY UV LIGHT: CPT

## 2024-12-09 ENCOUNTER — APPOINTMENT (OUTPATIENT)
Dept: DERMATOLOGY | Facility: CLINIC | Age: 44
End: 2024-12-09
Payer: MEDICAID

## 2024-12-09 PROCEDURE — 96900 ACTINOTHERAPY UV LIGHT: CPT

## 2024-12-11 ENCOUNTER — APPOINTMENT (OUTPATIENT)
Dept: DERMATOLOGY | Facility: CLINIC | Age: 44
End: 2024-12-11
Payer: MEDICAID

## 2024-12-11 PROCEDURE — 96900 ACTINOTHERAPY UV LIGHT: CPT

## 2024-12-13 ENCOUNTER — APPOINTMENT (OUTPATIENT)
Dept: DERMATOLOGY | Facility: CLINIC | Age: 44
End: 2024-12-13
Payer: MEDICAID

## 2024-12-13 PROCEDURE — 96900 ACTINOTHERAPY UV LIGHT: CPT

## 2024-12-16 ENCOUNTER — APPOINTMENT (OUTPATIENT)
Dept: DERMATOLOGY | Facility: CLINIC | Age: 44
End: 2024-12-16
Payer: MEDICAID

## 2024-12-16 PROCEDURE — 96900 ACTINOTHERAPY UV LIGHT: CPT

## 2024-12-18 ENCOUNTER — APPOINTMENT (OUTPATIENT)
Dept: DERMATOLOGY | Facility: CLINIC | Age: 44
End: 2024-12-18
Payer: MEDICAID

## 2024-12-18 PROCEDURE — 96900 ACTINOTHERAPY UV LIGHT: CPT

## 2024-12-20 ENCOUNTER — APPOINTMENT (OUTPATIENT)
Dept: DERMATOLOGY | Facility: CLINIC | Age: 44
End: 2024-12-20
Payer: MEDICAID

## 2024-12-20 PROCEDURE — 96900 ACTINOTHERAPY UV LIGHT: CPT

## 2024-12-23 ENCOUNTER — APPOINTMENT (OUTPATIENT)
Dept: DERMATOLOGY | Facility: CLINIC | Age: 44
End: 2024-12-23
Payer: MEDICAID

## 2024-12-23 PROCEDURE — 96900 ACTINOTHERAPY UV LIGHT: CPT

## 2024-12-27 ENCOUNTER — APPOINTMENT (OUTPATIENT)
Dept: DERMATOLOGY | Facility: CLINIC | Age: 44
End: 2024-12-27
Payer: MEDICAID

## 2024-12-27 PROCEDURE — 96900 ACTINOTHERAPY UV LIGHT: CPT

## 2024-12-30 ENCOUNTER — APPOINTMENT (OUTPATIENT)
Dept: DERMATOLOGY | Facility: CLINIC | Age: 44
End: 2024-12-30
Payer: MEDICAID

## 2024-12-30 PROCEDURE — 96910 PHOTCHMTX TAR&UVB/PTRLTM&UVB: CPT

## 2025-01-03 ENCOUNTER — APPOINTMENT (OUTPATIENT)
Dept: DERMATOLOGY | Facility: CLINIC | Age: 45
End: 2025-01-03
Payer: MEDICAID

## 2025-01-03 PROCEDURE — 96900 ACTINOTHERAPY UV LIGHT: CPT

## 2025-01-06 ENCOUNTER — APPOINTMENT (OUTPATIENT)
Dept: DERMATOLOGY | Facility: CLINIC | Age: 45
End: 2025-01-06
Payer: MEDICAID

## 2025-01-06 PROCEDURE — 96900 ACTINOTHERAPY UV LIGHT: CPT

## 2025-01-08 ENCOUNTER — APPOINTMENT (OUTPATIENT)
Dept: DERMATOLOGY | Facility: CLINIC | Age: 45
End: 2025-01-08
Payer: MEDICAID

## 2025-01-08 PROCEDURE — 96900 ACTINOTHERAPY UV LIGHT: CPT

## 2025-01-10 ENCOUNTER — APPOINTMENT (OUTPATIENT)
Dept: DERMATOLOGY | Facility: CLINIC | Age: 45
End: 2025-01-10

## 2025-01-13 ENCOUNTER — APPOINTMENT (OUTPATIENT)
Dept: INTERNAL MEDICINE | Facility: CLINIC | Age: 45
End: 2025-01-13
Payer: MEDICAID

## 2025-01-13 ENCOUNTER — APPOINTMENT (OUTPATIENT)
Dept: DERMATOLOGY | Facility: CLINIC | Age: 45
End: 2025-01-13
Payer: MEDICAID

## 2025-01-13 VITALS
SYSTOLIC BLOOD PRESSURE: 131 MMHG | WEIGHT: 195 LBS | HEART RATE: 73 BPM | BODY MASS INDEX: 30.61 KG/M2 | TEMPERATURE: 97.9 F | RESPIRATION RATE: 16 BRPM | DIASTOLIC BLOOD PRESSURE: 84 MMHG | HEIGHT: 67 IN | OXYGEN SATURATION: 99 %

## 2025-01-13 DIAGNOSIS — E11.9 TYPE 2 DIABETES MELLITUS W/OUT COMPLICATIONS: ICD-10-CM

## 2025-01-13 DIAGNOSIS — K75.81 NONALCOHOLIC STEATOHEPATITIS (NASH): ICD-10-CM

## 2025-01-13 DIAGNOSIS — L80 VITILIGO: ICD-10-CM

## 2025-01-13 PROCEDURE — 96900 ACTINOTHERAPY UV LIGHT: CPT

## 2025-01-13 PROCEDURE — G2211 COMPLEX E/M VISIT ADD ON: CPT | Mod: NC

## 2025-01-13 PROCEDURE — 99214 OFFICE O/P EST MOD 30 MIN: CPT

## 2025-01-15 ENCOUNTER — APPOINTMENT (OUTPATIENT)
Dept: DERMATOLOGY | Facility: CLINIC | Age: 45
End: 2025-01-15
Payer: MEDICAID

## 2025-01-15 PROCEDURE — 96900 ACTINOTHERAPY UV LIGHT: CPT

## 2025-01-17 ENCOUNTER — APPOINTMENT (OUTPATIENT)
Dept: DERMATOLOGY | Facility: CLINIC | Age: 45
End: 2025-01-17
Payer: MEDICAID

## 2025-01-17 LAB
ALBUMIN SERPL ELPH-MCNC: 4.5 G/DL
ALP BLD-CCNC: 71 U/L
ALT SERPL-CCNC: 42 U/L
ANION GAP SERPL CALC-SCNC: 13 MMOL/L
AST SERPL-CCNC: 33 U/L
BILIRUB SERPL-MCNC: 0.2 MG/DL
BUN SERPL-MCNC: 14 MG/DL
CALCIUM SERPL-MCNC: 10 MG/DL
CHLORIDE SERPL-SCNC: 102 MMOL/L
CHOLEST SERPL-MCNC: 117 MG/DL
CO2 SERPL-SCNC: 27 MMOL/L
CREAT SERPL-MCNC: 1.02 MG/DL
CREAT SPEC-SCNC: 148 MG/DL
EGFR: 92 ML/MIN/1.73M2
ESTIMATED AVERAGE GLUCOSE: 143 MG/DL
GLUCOSE SERPL-MCNC: 83 MG/DL
HBA1C MFR BLD HPLC: 6.6 %
HDLC SERPL-MCNC: 37 MG/DL
LDLC SERPL CALC-MCNC: 43 MG/DL
MICROALBUMIN 24H UR DL<=1MG/L-MCNC: 6 MG/DL
MICROALBUMIN/CREAT 24H UR-RTO: 40 MG/G
NONHDLC SERPL-MCNC: 80 MG/DL
POTASSIUM SERPL-SCNC: 4.4 MMOL/L
PROT SERPL-MCNC: 7.1 G/DL
SODIUM SERPL-SCNC: 142 MMOL/L
TRIGL SERPL-MCNC: 237 MG/DL
TSH SERPL-ACNC: 2.11 UIU/ML
VIT B12 SERPL-MCNC: 244 PG/ML

## 2025-01-17 PROCEDURE — 96900 ACTINOTHERAPY UV LIGHT: CPT

## 2025-01-22 ENCOUNTER — APPOINTMENT (OUTPATIENT)
Dept: DERMATOLOGY | Facility: CLINIC | Age: 45
End: 2025-01-22
Payer: MEDICAID

## 2025-01-22 PROCEDURE — 96900 ACTINOTHERAPY UV LIGHT: CPT

## 2025-01-24 ENCOUNTER — APPOINTMENT (OUTPATIENT)
Dept: DERMATOLOGY | Facility: CLINIC | Age: 45
End: 2025-01-24
Payer: MEDICAID

## 2025-01-24 ENCOUNTER — APPOINTMENT (OUTPATIENT)
Dept: HEPATOLOGY | Facility: CLINIC | Age: 45
End: 2025-01-24
Payer: MEDICAID

## 2025-01-24 VITALS
DIASTOLIC BLOOD PRESSURE: 61 MMHG | WEIGHT: 192 LBS | HEIGHT: 67 IN | HEART RATE: 83 BPM | BODY MASS INDEX: 30.13 KG/M2 | RESPIRATION RATE: 16 BRPM | TEMPERATURE: 97.8 F | OXYGEN SATURATION: 99 % | SYSTOLIC BLOOD PRESSURE: 124 MMHG

## 2025-01-24 DIAGNOSIS — E78.5 HYPERLIPIDEMIA, UNSPECIFIED: ICD-10-CM

## 2025-01-24 DIAGNOSIS — K74.00 HEPATIC FIBROSIS, UNSPECIFIED: ICD-10-CM

## 2025-01-24 DIAGNOSIS — K75.81 NONALCOHOLIC STEATOHEPATITIS (NASH): ICD-10-CM

## 2025-01-24 PROCEDURE — 96900 ACTINOTHERAPY UV LIGHT: CPT

## 2025-01-24 PROCEDURE — 99214 OFFICE O/P EST MOD 30 MIN: CPT

## 2025-01-27 ENCOUNTER — APPOINTMENT (OUTPATIENT)
Dept: DERMATOLOGY | Facility: CLINIC | Age: 45
End: 2025-01-27

## 2025-01-29 ENCOUNTER — APPOINTMENT (OUTPATIENT)
Dept: DERMATOLOGY | Facility: CLINIC | Age: 45
End: 2025-01-29
Payer: MEDICAID

## 2025-01-29 PROCEDURE — 96900 ACTINOTHERAPY UV LIGHT: CPT

## 2025-01-31 ENCOUNTER — APPOINTMENT (OUTPATIENT)
Dept: DERMATOLOGY | Facility: CLINIC | Age: 45
End: 2025-01-31
Payer: MEDICAID

## 2025-01-31 PROCEDURE — 96900 ACTINOTHERAPY UV LIGHT: CPT

## 2025-02-03 ENCOUNTER — APPOINTMENT (OUTPATIENT)
Dept: TRANSPLANT | Facility: CLINIC | Age: 45
End: 2025-02-03

## 2025-02-03 ENCOUNTER — NON-APPOINTMENT (OUTPATIENT)
Age: 45
End: 2025-02-03

## 2025-02-03 ENCOUNTER — APPOINTMENT (OUTPATIENT)
Dept: DERMATOLOGY | Facility: CLINIC | Age: 45
End: 2025-02-03
Payer: MEDICAID

## 2025-02-03 PROCEDURE — 96900 ACTINOTHERAPY UV LIGHT: CPT

## 2025-02-03 RX ORDER — RESMETIROM 80 MG/1
80 TABLET, COATED ORAL
Qty: 30 | Refills: 2 | Status: ACTIVE | COMMUNITY
Start: 2025-02-03 | End: 1900-01-01

## 2025-02-05 ENCOUNTER — APPOINTMENT (OUTPATIENT)
Dept: DERMATOLOGY | Facility: CLINIC | Age: 45
End: 2025-02-05
Payer: MEDICAID

## 2025-02-05 PROCEDURE — 96900 ACTINOTHERAPY UV LIGHT: CPT

## 2025-02-07 ENCOUNTER — APPOINTMENT (OUTPATIENT)
Dept: DERMATOLOGY | Facility: CLINIC | Age: 45
End: 2025-02-07
Payer: MEDICAID

## 2025-02-07 PROCEDURE — 96900 ACTINOTHERAPY UV LIGHT: CPT

## 2025-02-10 ENCOUNTER — APPOINTMENT (OUTPATIENT)
Dept: DERMATOLOGY | Facility: CLINIC | Age: 45
End: 2025-02-10
Payer: MEDICAID

## 2025-02-10 PROCEDURE — 96900 ACTINOTHERAPY UV LIGHT: CPT

## 2025-02-12 ENCOUNTER — APPOINTMENT (OUTPATIENT)
Dept: DERMATOLOGY | Facility: CLINIC | Age: 45
End: 2025-02-12
Payer: MEDICAID

## 2025-02-12 PROCEDURE — 96900 ACTINOTHERAPY UV LIGHT: CPT

## 2025-02-14 ENCOUNTER — APPOINTMENT (OUTPATIENT)
Dept: DERMATOLOGY | Facility: CLINIC | Age: 45
End: 2025-02-14

## 2025-02-14 PROCEDURE — 96900 ACTINOTHERAPY UV LIGHT: CPT

## 2025-02-19 ENCOUNTER — APPOINTMENT (OUTPATIENT)
Dept: DERMATOLOGY | Facility: CLINIC | Age: 45
End: 2025-02-19
Payer: MEDICAID

## 2025-02-19 PROCEDURE — 96900 ACTINOTHERAPY UV LIGHT: CPT

## 2025-02-21 ENCOUNTER — APPOINTMENT (OUTPATIENT)
Dept: DERMATOLOGY | Facility: CLINIC | Age: 45
End: 2025-02-21
Payer: MEDICAID

## 2025-02-21 PROCEDURE — 96900 ACTINOTHERAPY UV LIGHT: CPT

## 2025-02-24 ENCOUNTER — APPOINTMENT (OUTPATIENT)
Dept: DERMATOLOGY | Facility: CLINIC | Age: 45
End: 2025-02-24
Payer: MEDICAID

## 2025-02-24 PROCEDURE — 96900 ACTINOTHERAPY UV LIGHT: CPT

## 2025-02-26 ENCOUNTER — APPOINTMENT (OUTPATIENT)
Dept: DERMATOLOGY | Facility: CLINIC | Age: 45
End: 2025-02-26
Payer: MEDICAID

## 2025-02-26 PROCEDURE — 96900 ACTINOTHERAPY UV LIGHT: CPT

## 2025-02-28 ENCOUNTER — APPOINTMENT (OUTPATIENT)
Dept: DERMATOLOGY | Facility: CLINIC | Age: 45
End: 2025-02-28
Payer: MEDICAID

## 2025-02-28 PROCEDURE — 96900 ACTINOTHERAPY UV LIGHT: CPT

## 2025-03-03 ENCOUNTER — APPOINTMENT (OUTPATIENT)
Dept: DERMATOLOGY | Facility: CLINIC | Age: 45
End: 2025-03-03
Payer: MEDICAID

## 2025-03-03 PROCEDURE — 96900 ACTINOTHERAPY UV LIGHT: CPT

## 2025-03-05 ENCOUNTER — APPOINTMENT (OUTPATIENT)
Dept: DERMATOLOGY | Facility: CLINIC | Age: 45
End: 2025-03-05
Payer: MEDICAID

## 2025-03-05 PROCEDURE — 96900 ACTINOTHERAPY UV LIGHT: CPT

## 2025-03-06 ENCOUNTER — TRANSCRIPTION ENCOUNTER (OUTPATIENT)
Age: 45
End: 2025-03-06

## 2025-03-07 ENCOUNTER — APPOINTMENT (OUTPATIENT)
Dept: DERMATOLOGY | Facility: CLINIC | Age: 45
End: 2025-03-07
Payer: MEDICAID

## 2025-03-07 PROCEDURE — 96900 ACTINOTHERAPY UV LIGHT: CPT

## 2025-03-10 ENCOUNTER — APPOINTMENT (OUTPATIENT)
Dept: DERMATOLOGY | Facility: CLINIC | Age: 45
End: 2025-03-10
Payer: MEDICAID

## 2025-03-10 PROCEDURE — 96900 ACTINOTHERAPY UV LIGHT: CPT

## 2025-03-12 ENCOUNTER — APPOINTMENT (OUTPATIENT)
Dept: DERMATOLOGY | Facility: CLINIC | Age: 45
End: 2025-03-12
Payer: MEDICAID

## 2025-03-12 PROCEDURE — 96900 ACTINOTHERAPY UV LIGHT: CPT

## 2025-03-14 ENCOUNTER — APPOINTMENT (OUTPATIENT)
Dept: DERMATOLOGY | Facility: CLINIC | Age: 45
End: 2025-03-14
Payer: MEDICAID

## 2025-03-14 DIAGNOSIS — L80 VITILIGO: ICD-10-CM

## 2025-03-14 PROCEDURE — 99213 OFFICE O/P EST LOW 20 MIN: CPT

## 2025-03-14 PROCEDURE — 96900 ACTINOTHERAPY UV LIGHT: CPT

## 2025-03-17 ENCOUNTER — APPOINTMENT (OUTPATIENT)
Dept: DERMATOLOGY | Facility: CLINIC | Age: 45
End: 2025-03-17

## 2025-03-19 ENCOUNTER — APPOINTMENT (OUTPATIENT)
Dept: DERMATOLOGY | Facility: CLINIC | Age: 45
End: 2025-03-19
Payer: MEDICAID

## 2025-03-19 PROCEDURE — 96900 ACTINOTHERAPY UV LIGHT: CPT

## 2025-03-21 ENCOUNTER — APPOINTMENT (OUTPATIENT)
Dept: DERMATOLOGY | Facility: CLINIC | Age: 45
End: 2025-03-21

## 2025-03-24 ENCOUNTER — APPOINTMENT (OUTPATIENT)
Dept: DERMATOLOGY | Facility: CLINIC | Age: 45
End: 2025-03-24

## 2025-03-26 ENCOUNTER — APPOINTMENT (OUTPATIENT)
Dept: DERMATOLOGY | Facility: CLINIC | Age: 45
End: 2025-03-26
Payer: MEDICAID

## 2025-03-26 PROCEDURE — 96900 ACTINOTHERAPY UV LIGHT: CPT

## 2025-03-27 ENCOUNTER — NON-APPOINTMENT (OUTPATIENT)
Age: 45
End: 2025-03-27

## 2025-03-28 ENCOUNTER — APPOINTMENT (OUTPATIENT)
Dept: DERMATOLOGY | Facility: CLINIC | Age: 45
End: 2025-03-28

## 2025-03-31 ENCOUNTER — APPOINTMENT (OUTPATIENT)
Dept: DERMATOLOGY | Facility: CLINIC | Age: 45
End: 2025-03-31

## 2025-04-02 ENCOUNTER — APPOINTMENT (OUTPATIENT)
Dept: DERMATOLOGY | Facility: CLINIC | Age: 45
End: 2025-04-02
Payer: MEDICAID

## 2025-04-02 PROCEDURE — 96910 PHOTCHMTX TAR&UVB/PTRLTM&UVB: CPT

## 2025-04-04 ENCOUNTER — APPOINTMENT (OUTPATIENT)
Dept: DERMATOLOGY | Facility: CLINIC | Age: 45
End: 2025-04-04

## 2025-04-09 ENCOUNTER — APPOINTMENT (OUTPATIENT)
Dept: DERMATOLOGY | Facility: CLINIC | Age: 45
End: 2025-04-09
Payer: MEDICAID

## 2025-04-09 PROCEDURE — 96900 ACTINOTHERAPY UV LIGHT: CPT

## 2025-04-14 ENCOUNTER — APPOINTMENT (OUTPATIENT)
Dept: INTERNAL MEDICINE | Facility: CLINIC | Age: 45
End: 2025-04-14
Payer: MEDICAID

## 2025-04-14 VITALS
HEIGHT: 67 IN | WEIGHT: 184 LBS | DIASTOLIC BLOOD PRESSURE: 86 MMHG | OXYGEN SATURATION: 98 % | HEART RATE: 76 BPM | SYSTOLIC BLOOD PRESSURE: 135 MMHG | BODY MASS INDEX: 28.88 KG/M2 | RESPIRATION RATE: 16 BRPM | TEMPERATURE: 97.1 F

## 2025-04-14 DIAGNOSIS — Z12.11 ENCOUNTER FOR SCREENING FOR MALIGNANT NEOPLASM OF COLON: ICD-10-CM

## 2025-04-14 DIAGNOSIS — K75.81 NONALCOHOLIC STEATOHEPATITIS (NASH): ICD-10-CM

## 2025-04-14 DIAGNOSIS — E03.9 HYPOTHYROIDISM, UNSPECIFIED: ICD-10-CM

## 2025-04-14 DIAGNOSIS — E11.9 TYPE 2 DIABETES MELLITUS W/OUT COMPLICATIONS: ICD-10-CM

## 2025-04-14 PROCEDURE — 99214 OFFICE O/P EST MOD 30 MIN: CPT

## 2025-04-14 RX ORDER — LISINOPRIL 2.5 MG/1
2.5 TABLET ORAL
Qty: 90 | Refills: 1 | Status: ACTIVE | COMMUNITY
Start: 2025-04-14 | End: 1900-01-01

## 2025-04-16 ENCOUNTER — APPOINTMENT (OUTPATIENT)
Dept: DERMATOLOGY | Facility: CLINIC | Age: 45
End: 2025-04-16
Payer: MEDICAID

## 2025-04-16 PROCEDURE — 96900 ACTINOTHERAPY UV LIGHT: CPT

## 2025-04-18 ENCOUNTER — TRANSCRIPTION ENCOUNTER (OUTPATIENT)
Age: 45
End: 2025-04-18

## 2025-04-18 LAB
ALBUMIN SERPL ELPH-MCNC: 4.5 G/DL
ALP BLD-CCNC: 64 U/L
ALT SERPL-CCNC: 37 U/L
ANION GAP SERPL CALC-SCNC: 12 MMOL/L
AST SERPL-CCNC: 28 U/L
BILIRUB SERPL-MCNC: 0.2 MG/DL
BUN SERPL-MCNC: 14 MG/DL
CALCIUM SERPL-MCNC: 9.9 MG/DL
CHLORIDE SERPL-SCNC: 102 MMOL/L
CHOLEST SERPL-MCNC: 106 MG/DL
CO2 SERPL-SCNC: 26 MMOL/L
CREAT SERPL-MCNC: 0.93 MG/DL
CREAT SPEC-SCNC: 112 MG/DL
EGFRCR SERPLBLD CKD-EPI 2021: 103 ML/MIN/1.73M2
ESTIMATED AVERAGE GLUCOSE: 148 MG/DL
GLUCOSE SERPL-MCNC: 85 MG/DL
HBA1C MFR BLD HPLC: 6.8 %
HDLC SERPL-MCNC: 37 MG/DL
LDLC SERPL-MCNC: 42 MG/DL
MICROALBUMIN 24H UR DL<=1MG/L-MCNC: 3.6 MG/DL
MICROALBUMIN/CREAT 24H UR-RTO: 32 MG/G
NONHDLC SERPL-MCNC: 69 MG/DL
POTASSIUM SERPL-SCNC: 4.7 MMOL/L
PROT SERPL-MCNC: 6.9 G/DL
SODIUM SERPL-SCNC: 140 MMOL/L
TRIGL SERPL-MCNC: 164 MG/DL
TSH SERPL-ACNC: 0.38 UIU/ML

## 2025-04-23 ENCOUNTER — APPOINTMENT (OUTPATIENT)
Dept: DERMATOLOGY | Facility: CLINIC | Age: 45
End: 2025-04-23
Payer: MEDICAID

## 2025-04-23 PROCEDURE — 96900 ACTINOTHERAPY UV LIGHT: CPT

## 2025-04-28 ENCOUNTER — RX RENEWAL (OUTPATIENT)
Age: 45
End: 2025-04-28

## 2025-04-29 ENCOUNTER — APPOINTMENT (OUTPATIENT)
Dept: HEPATOLOGY | Facility: CLINIC | Age: 45
End: 2025-04-29
Payer: MEDICAID

## 2025-04-29 VITALS
BODY MASS INDEX: 28.98 KG/M2 | TEMPERATURE: 97.6 F | HEART RATE: 79 BPM | RESPIRATION RATE: 16 BRPM | DIASTOLIC BLOOD PRESSURE: 79 MMHG | SYSTOLIC BLOOD PRESSURE: 119 MMHG | WEIGHT: 185 LBS | OXYGEN SATURATION: 98 %

## 2025-04-29 DIAGNOSIS — E78.5 HYPERLIPIDEMIA, UNSPECIFIED: ICD-10-CM

## 2025-04-29 DIAGNOSIS — K74.00 HEPATIC FIBROSIS, UNSPECIFIED: ICD-10-CM

## 2025-04-29 DIAGNOSIS — E66.9 OBESITY, UNSPECIFIED: ICD-10-CM

## 2025-04-29 DIAGNOSIS — K75.81 NONALCOHOLIC STEATOHEPATITIS (NASH): ICD-10-CM

## 2025-04-29 PROCEDURE — 99214 OFFICE O/P EST MOD 30 MIN: CPT

## 2025-04-30 ENCOUNTER — APPOINTMENT (OUTPATIENT)
Dept: DERMATOLOGY | Facility: CLINIC | Age: 45
End: 2025-04-30
Payer: MEDICAID

## 2025-04-30 PROCEDURE — 96900 ACTINOTHERAPY UV LIGHT: CPT

## 2025-05-07 ENCOUNTER — APPOINTMENT (OUTPATIENT)
Dept: DERMATOLOGY | Facility: CLINIC | Age: 45
End: 2025-05-07
Payer: MEDICAID

## 2025-05-07 PROCEDURE — 96900 ACTINOTHERAPY UV LIGHT: CPT

## 2025-05-14 ENCOUNTER — APPOINTMENT (OUTPATIENT)
Dept: DERMATOLOGY | Facility: CLINIC | Age: 45
End: 2025-05-14
Payer: MEDICAID

## 2025-05-14 PROCEDURE — 96900 ACTINOTHERAPY UV LIGHT: CPT

## 2025-05-21 ENCOUNTER — APPOINTMENT (OUTPATIENT)
Dept: DERMATOLOGY | Facility: CLINIC | Age: 45
End: 2025-05-21
Payer: MEDICAID

## 2025-05-21 PROCEDURE — 96900 ACTINOTHERAPY UV LIGHT: CPT

## 2025-05-28 ENCOUNTER — APPOINTMENT (OUTPATIENT)
Dept: DERMATOLOGY | Facility: CLINIC | Age: 45
End: 2025-05-28

## 2025-05-29 ENCOUNTER — RX RENEWAL (OUTPATIENT)
Age: 45
End: 2025-05-29

## 2025-06-04 ENCOUNTER — APPOINTMENT (OUTPATIENT)
Dept: DERMATOLOGY | Facility: CLINIC | Age: 45
End: 2025-06-04

## 2025-06-04 PROCEDURE — 96900 ACTINOTHERAPY UV LIGHT: CPT

## 2025-06-11 ENCOUNTER — APPOINTMENT (OUTPATIENT)
Dept: DERMATOLOGY | Facility: CLINIC | Age: 45
End: 2025-06-11

## 2025-06-18 ENCOUNTER — APPOINTMENT (OUTPATIENT)
Dept: DERMATOLOGY | Facility: CLINIC | Age: 45
End: 2025-06-18

## 2025-06-25 ENCOUNTER — APPOINTMENT (OUTPATIENT)
Dept: DERMATOLOGY | Facility: CLINIC | Age: 45
End: 2025-06-25

## 2025-07-14 ENCOUNTER — LABORATORY RESULT (OUTPATIENT)
Age: 45
End: 2025-07-14

## 2025-07-14 ENCOUNTER — APPOINTMENT (OUTPATIENT)
Dept: INTERNAL MEDICINE | Facility: CLINIC | Age: 45
End: 2025-07-14
Payer: MEDICAID

## 2025-07-14 VITALS
HEIGHT: 67 IN | WEIGHT: 183 LBS | OXYGEN SATURATION: 97 % | TEMPERATURE: 98.4 F | RESPIRATION RATE: 16 BRPM | SYSTOLIC BLOOD PRESSURE: 125 MMHG | BODY MASS INDEX: 28.72 KG/M2 | HEART RATE: 69 BPM | DIASTOLIC BLOOD PRESSURE: 81 MMHG

## 2025-07-14 PROCEDURE — 99214 OFFICE O/P EST MOD 30 MIN: CPT | Mod: 25

## 2025-07-16 LAB
25(OH)D3 SERPL-MCNC: 29.7 NG/ML
APPEARANCE: CLEAR
BILIRUBIN URINE: NEGATIVE
BLOOD URINE: NEGATIVE
COLOR: YELLOW
GLUCOSE QUALITATIVE U: NEGATIVE MG/DL
KETONES URINE: NEGATIVE MG/DL
LEUKOCYTE ESTERASE URINE: NEGATIVE
NITRITE URINE: NEGATIVE
PH URINE: 6.5
PROTEIN URINE: NEGATIVE MG/DL
SPECIFIC GRAVITY URINE: 1.02
TSH SERPL-ACNC: 11.8 UIU/ML
UROBILINOGEN URINE: 0.2 MG/DL

## 2025-08-01 ENCOUNTER — APPOINTMENT (OUTPATIENT)
Dept: HEPATOLOGY | Facility: CLINIC | Age: 45
End: 2025-08-01
Payer: MEDICAID

## 2025-08-01 VITALS
OXYGEN SATURATION: 97 % | RESPIRATION RATE: 16 BRPM | DIASTOLIC BLOOD PRESSURE: 72 MMHG | BODY MASS INDEX: 28.56 KG/M2 | HEART RATE: 62 BPM | SYSTOLIC BLOOD PRESSURE: 119 MMHG | TEMPERATURE: 97.3 F | WEIGHT: 182 LBS | HEIGHT: 67 IN

## 2025-08-01 DIAGNOSIS — E66.3 OVERWEIGHT: ICD-10-CM

## 2025-08-01 DIAGNOSIS — K74.00 HEPATIC FIBROSIS, UNSPECIFIED: ICD-10-CM

## 2025-08-01 DIAGNOSIS — Z12.11 ENCOUNTER FOR SCREENING FOR MALIGNANT NEOPLASM OF COLON: ICD-10-CM

## 2025-08-01 DIAGNOSIS — K75.81 NONALCOHOLIC STEATOHEPATITIS (NASH): ICD-10-CM

## 2025-08-01 PROCEDURE — 99214 OFFICE O/P EST MOD 30 MIN: CPT

## 2025-08-03 PROBLEM — E66.3 OVERWEIGHT: Status: ACTIVE | Noted: 2025-08-03

## (undated) DEVICE — MASK OXYGEN PANORAMIC

## (undated) DEVICE — KIT ENDO PROCEDURE CUST W/VLV

## (undated) DEVICE — FORMALIN CUPS 10% BUFFERED

## (undated) DEVICE — WRAP COMPRESSION CALF MED

## (undated) DEVICE — TUBING CANNULA SALTER LABS NASAL ADULT 7FT

## (undated) DEVICE — SOL INJ NS 0.9% 500ML 1-PORT

## (undated) DEVICE — BITE BLOCK SCOPE SAVER 20X27MM ADULT GREEN

## (undated) DEVICE — RADIAL JAW 4 240CM WITH NDL